# Patient Record
Sex: MALE | Race: WHITE | NOT HISPANIC OR LATINO | Employment: FULL TIME | URBAN - METROPOLITAN AREA
[De-identification: names, ages, dates, MRNs, and addresses within clinical notes are randomized per-mention and may not be internally consistent; named-entity substitution may affect disease eponyms.]

---

## 2018-12-06 ENCOUNTER — TRANSFERRED RECORDS (OUTPATIENT)
Dept: HEALTH INFORMATION MANAGEMENT | Facility: CLINIC | Age: 58
End: 2018-12-06

## 2019-07-22 ENCOUNTER — REFERRAL (OUTPATIENT)
Dept: TRANSPLANT | Facility: CLINIC | Age: 59
End: 2019-07-22

## 2019-07-22 DIAGNOSIS — Z01.818 ENCOUNTER FOR PRE-TRANSPLANT EVALUATION FOR KIDNEY AND PANCREAS TRANSPLANT: ICD-10-CM

## 2019-07-22 DIAGNOSIS — Z76.82 ORGAN TRANSPLANT CANDIDATE: ICD-10-CM

## 2019-07-22 DIAGNOSIS — I10 ESSENTIAL HYPERTENSION: ICD-10-CM

## 2019-07-22 DIAGNOSIS — E78.5 HYPERLIPIDEMIA: ICD-10-CM

## 2019-07-22 DIAGNOSIS — E11.9 DIABETES MELLITUS, TYPE 2 (H): ICD-10-CM

## 2019-07-22 DIAGNOSIS — Z87.891 HISTORY OF TOBACCO USE: ICD-10-CM

## 2019-07-22 DIAGNOSIS — N18.9 CHRONIC RENAL FAILURE: ICD-10-CM

## 2019-07-22 DIAGNOSIS — G47.33 OBSTRUCTIVE SLEEP APNEA: ICD-10-CM

## 2019-07-22 NOTE — LETTER
7/30/19    Dioni TEJEDA Marina  [address] [address] [place] [address]      Dear Dioni,    Thank you for your interest in the Transplant Center at [facility], [facility]. We look forward to being a part of your care team and assisting you through the transplant process.    As we discussed, your transplant coordinator is Erin Bradford (Kidney).  You may call your coordinator at any time with questions or concerns call [phone].    Please complete the following.    1. Fill out and return the enclosed forms    Authorization for Electronic Communication    Authorization to Discuss Protected Health Information    Authorization for Release of Protected Health Information    2. Sign up for:    [employer], access to your electronic medical record (see enclosed pamphlet)    [email or web address], a transplant education website    You can use these tools to learn more about your transplant, communicate with your care team, and track your medical details    Sincerely,  Solid Organ Transplant  [facility], [facility]    cc: Mikala Rodriguez NP (PCP), Teddy Hamilton MD

## 2019-07-29 VITALS — WEIGHT: 246 LBS | HEIGHT: 69 IN | BODY MASS INDEX: 36.43 KG/M2

## 2019-07-29 ASSESSMENT — MIFFLIN-ST. JEOR: SCORE: 1931.23

## 2019-07-29 NOTE — TELEPHONE ENCOUNTER
PCP: Dr. Mikala Rodriguez   Referring Provider: Dr. Teddy Hamilton   Referring Diagnosis: CKD Stage 4     Insurance information:  [facility]  Policy spears:  Self   Subscriber/policy/ID number: [id number]  Group Number: [id number]    Is patient in a group home/assisted living? No  Does patient have a guardian? No    Referral intake process completed.  Patient is aware that after financial approval is received, medical records will be requested.   Patient confirmed for a callback from transplant coordinator on 8/13/19. (within 2 weeks)  Tentative evaluation date didn't have his calendar with him. (within 4 weeks)    Confirmed coordinator will discuss evaluation process in more detail at the time of their call.   Patient is aware of the need to arrange age appropriate cancer screening, vaccinations, and dental care.  Reminded patient to complete questionnaire, complete medical records release, and review packet prior to evaluation visit .  Assessed patient for special needs (ie--wheelchair, assistance, guardian, and ): No  Patient instructed to call [phone] with questions.     ALICE Ackerman, LPN   Solid Organ Transplant

## 2019-08-13 NOTE — TELEPHONE ENCOUNTER
"I(Gardenia Hanley)Contacted patient and introduced myself as working their Transplant Coordinator(Karime), also introduced the role of the Transplant Coordinator in the transplant process.  Explained the purpose of this call including reviewing next steps and answering questions.    Confirmed Referring Provider, Dialysis Center(NA at this time), and Primary Care Physician. Notified patient of the importance of continued communication with referring providers and primary care physicians.    Reviewed components of transplant evaluation process including necessary appointments, tests, and procedures.    Answered questions for patient regarding evaluation, provided Karime's  name and contact information and requested they call with any additional questions.    Determined that patient would like additional information regarding transplant by:      Mail,Phone Call  Notified patient that he will hear from a Transplant  to schedule evaluation.     Reviewed medical/surgical information to date with the patient(pt)from [employer].Pt.Pt.stated he was referred to [facility] per his Nephrologist for a Kidney/Pancreas.He also went through [facility] Pre eval and was declined  May 2018 due to A1C  not < 8 . Pt.stated he never did have a kidney BX and was told his renal failure was most likely due to DM and HTN.GFR is now about 10 and not on dialysis yet.BMI 36.33 Pt. was DX with diabetes approximately sixteen years ago.When asked about his Lantus at [facility] pt.stated he quit taking his insulin all together at least two months ago.Pt. also stated he stopped checking his blood sugars for several months now \"except only when he does not feel well.\" Pt.stated he stopped taking his insulin \"because he did not feel it helped.\" He did not let his MD know this.Pt.also stated he does not feel he has had a Hgb A1C for a quite some time.\" May 2019 pt.had infection on  4th digit on his left foot that lead to  toe amputation.Pt.also had some right lower " "leg edema May 2019.Pt.has diabetic retinopathy and sees a  for eye injections every three months.Pt.has ADRIAN but states he has not used his CPAP for probably five years due to he felt the pressure was not right and he did not want to take the time to have his machine looked at.Pt.has a sixteen year 3 packs/day smoking Hx but states he quit 1994.Denies alcohol or recreational drug use.Pt.states he has never had a colonoscopy-\"everything else has been scanned before so I did not feel it was necessary.Appendectomy 2017.Hernia repair with [facility] approximately 1996.Pt.is followed by his Nephrologist Dr.Thomas Hamilton and pt.stated he has an appt.with him 8/16/19.Pt.stated he was an appt.with a new PCP next week with a DR. Duque.Pt.does not follow with an endocrinologist or cardiologist. When pt. had a CT for his appendectomy  2017 an incidental  nodule on the small bowel and lung were noted.Pt.did see a pulmonologist but  did not follow up with pulmomary nodule clinic. MR enterography 2/1/16-negative.Renal cyst 12 mm left renal sinus cysts -renal US. Recommended renal CT was not done.  Pt. states he has some vision deficits and will have his wife help hm fill out the ELI forms.     Patient verbalized understanding of content presented today.    Smart set orders sent.      "

## 2019-08-13 NOTE — TELEPHONE ENCOUNTER
Called patient as I had a KP opening at the end of August on Thurs the 29 and hr confirmed for this date.  W/[employer], I'll call [facility] [employer] to see if they have an opening on this Thurs with Cari if possible.

## 2019-08-13 NOTE — TELEPHONE ENCOUNTER
I also talked with Harshal as part of the pre kidney evaluation phone call. He has heard about pancreas transplant from his nephrologist so we spent some time on this. He is supposed to be taking Lantus but he worked his way up to 15 unit(s) at  and did not feel like it was bringing his blood sugar down. I told him most likely he was not seeing a response because he was not taking enough insulin. He stopped taking the insulin on his own and quit checking his blood sugars. I emphasized the importance of strict adherence to a medication and lab monitoring regimen and he seemed to understand. He has ADRIAN and should be using CPAP. He stopped using it because he felt the pressure was too high and refused to take the machine in for any adjustment. He simply quit using it. He has had diabetic foot ulcers and a toe amputated. He states he currently has no open wounds or infections. He had a CT done with an episode of appendicitis and a nodule was noted in his jejunum. A follow up enterography was negative. A pulmonary nodule was also noted on this CT. He did see a pulmonologist and was recommended to be seen in the pulmonary nodule clinic which was not done. He felt there was nothing to worry about and did not follow through. 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 The CT also indicated a 12 mm renal cyst and a renal ultrasound was done with the recommendation of a specific renal CT which was also not done. His BMI is 36.33 and we discussed the BMI criteria for kidney and pancreas. He is in agreement to see bariatrics. He was seen at [facility] and declined until he got his diabetes under control. He tried to tell me his nephrologist does not care about the management of his diabetes.  He is due for colonoscopy and dental. He has no potential living donors.

## 2019-08-23 NOTE — PROGRESS NOTES
Outpatient MNT: Kidney Pancreas Transplant Evaluation    Current BMI: 37.43 (HT 68.5 in, .8 lbs/113.3 kg)  BMI is not within criteria of <35 for kidney transplant  Ideal BMI Goal for pancreas transplant <30 for DM II (or per MD) or <200 lbs (50 lb loss)       Time Spent: 30 minutes  Visit Type: Initial   Referring Physician: Dr. Arreola  Pt accompanied by: Wife, Lela     Medical dx associated with RD referral  DMT2, CKD stage 4    History of previous txp: None  Frequency of BG checks: 3x/day  Dialysis: None    Nutrition Assessment  Pt tries to follow a low potassium and low phos diet. He successfully cut out a lot of calorie containing beverages by switching to diet beverages.      Appetite: Good    Vitamins, Supplements, Pertinent Meds: Lasix, Insulin, Renagel (with meals)  Herbal Medicines/Supplements: None    Diet Recall  Breakfast Sometimes skips; Toast with butter and jelly; poptart; August and eggs; Hashbrowns with gravy   Lunch [place] or soup   Dinner Steak and corn; hamburger; hotdogs; Spaghetti; potatoes and ham    Snacks Chips, cookies   Beverages Diet soda, diet jabier aid, diet cranberry juice, black coffee   Alcohol Estimates he's drank a total of one 12-pack over the entire summer      Physical Activity   Tries to stay active, but becomes fatigued easily and limited by foot pain. Pt reports having a toe amputated d/t diabetic neuropathy.      Anthropometrics  Height:   68.5 in   BMI:    37.43    Weight Status:Obesity Grade II BMI 35-39.9   Weight:  249.8 lbs            IBW: 157 lbs (71.4 kg)  % IBW: 159% Wt Hx:   Wt Readings from Last 10 Encounters:   07/29/19 111.6 kg (246 lb)       Adj/dosing BW: 180 lbs/82 kg      Labs  A1C: 8.2 on 8/21/19 per care everywhere - Improved from 11.9 on 3/5/19  POTASSIUM: 4.3 (WNL) on 8/12/19 per care everywhere  PHOSPHORUS: 7.6 (H) on 8/12/19 per care everywhere    Malnutrition  % Intake: No decreased intake noted  % Weight Loss: None noted  Subcutaneous Fat Loss:  None  Muscle Loss: None  Fluid Accumulation/Edema: None noted  Malnutrition Diagnosis: Patient does not meet two of the above criteria necessary for diagnosing malnutrition    Estimated Nutrition Needs  Energy  1721-7805     (20-25 kcal/kg dosing BW for desired wt loss)   Protein  49-66    (0.6-0.8 g/kg for CKD)  (1.2-1.4 g/kg for HD/PD needs)         Fluid  1 ml/kcal or per MD   Micronutrient   Na+: <2000 mg/day  K+: 7471-2854 mg/day  Phos: 800-1000 mg/day            Nutrition Diagnosis  1. Excessive Na+ intake r/t food and nutrition related knowledge deficit AEB diet recall reveals high Na+ foods.    2. Food and nutrition related knowledge deficit r/t pre transplant eval AEB pt verbalized not hearing pre/post transplant diet guidelines.    3. Obesity r/t excessive energy intake and inadequate physical activity AEB BMI >30.    Nutrition Intervention  Nutrition education provided:  Discussed strategies for weight loss. Encouraged pt to avoid snacking between meals. To limit his portions of carbohydrates, and consume balanced meals.     Discussed sodium intake (low sodium foods and drinks, seasoning food without salt and tips for low sodium diet).    Reviewed post txp diet guidelines in brief (will review in further detail post txp):  (1) Review of proper food safety measures d/t immunosuppressant therapy post-op and increased risk for food-borne illness    (2) Avoid the following post txp d/t risk for rejection, unknown effects on the organs, and/or potential interactions with immunosuppressants:  - Herbal, Chinese, holistic, chiropractic, natural, alternative medicines and supplements  - Detoxes and cleanses  - Weight loss pills  - Protein powders or other products with extracts or herbs (ie green tea extract)    (3) Med regimen and possible side effects    Patient Understanding: Pt verbalized understanding of education provided.  Expected Compliance: good  Follow-Up Plans: PRN     Nutrition Goals  1. Limit Na+  <2000mg/day  2. Pt to verbalize understanding of 3 aspects of post txp education provided  3. Weight loss to goal wt <233 lbs (BMI <35) for kidney txp or wt <200 lbs (BMI <30 for DM II or per MD) for pancreas txp      Hortensia Perry, RD, LD  [facility] [phone]

## 2019-08-29 ENCOUNTER — ANCILLARY PROCEDURE (OUTPATIENT)
Dept: CT IMAGING | Facility: CLINIC | Age: 59
End: 2019-08-29
Attending: NURSE PRACTITIONER
Payer: MEDICARE

## 2019-08-29 ENCOUNTER — OFFICE VISIT (OUTPATIENT)
Dept: TRANSPLANT | Facility: CLINIC | Age: 59
End: 2019-08-29
Attending: PHYSICIAN ASSISTANT
Payer: MEDICARE

## 2019-08-29 ENCOUNTER — ANCILLARY PROCEDURE (OUTPATIENT)
Dept: GENERAL RADIOLOGY | Facility: CLINIC | Age: 59
End: 2019-08-29
Attending: PHYSICIAN ASSISTANT
Payer: COMMERCIAL

## 2019-08-29 ENCOUNTER — HOSPITAL ENCOUNTER (OUTPATIENT)
Dept: CARDIOLOGY | Facility: CLINIC | Age: 59
Discharge: HOME | End: 2019-08-29
Attending: PHYSICIAN ASSISTANT | Admitting: PHYSICIAN ASSISTANT
Payer: MEDICARE

## 2019-08-29 ENCOUNTER — ANCILLARY PROCEDURE (OUTPATIENT)
Dept: ULTRASOUND IMAGING | Facility: CLINIC | Age: 59
End: 2019-08-29
Attending: NURSE PRACTITIONER
Payer: MEDICARE

## 2019-08-29 VITALS
HEART RATE: 81 BPM | OXYGEN SATURATION: 98 % | HEIGHT: 69 IN | DIASTOLIC BLOOD PRESSURE: 70 MMHG | SYSTOLIC BLOOD PRESSURE: 142 MMHG | WEIGHT: 249.8 LBS | TEMPERATURE: 98.5 F | BODY MASS INDEX: 37 KG/M2

## 2019-08-29 DIAGNOSIS — Z76.82 ORGAN TRANSPLANT CANDIDATE: ICD-10-CM

## 2019-08-29 DIAGNOSIS — E11.9 DIABETES MELLITUS, TYPE 2 (H): ICD-10-CM

## 2019-08-29 DIAGNOSIS — Z87.891 HISTORY OF TOBACCO USE: ICD-10-CM

## 2019-08-29 DIAGNOSIS — I73.9 PAD (PERIPHERAL ARTERY DISEASE) (H): ICD-10-CM

## 2019-08-29 DIAGNOSIS — Z01.818 ENCOUNTER FOR PRE-TRANSPLANT EVALUATION FOR KIDNEY AND PANCREAS TRANSPLANT: Primary | ICD-10-CM

## 2019-08-29 DIAGNOSIS — Z01.818 PRE-TRANSPLANT EVALUATION FOR KIDNEY AND PANCREAS TRANSPLANT: Primary | ICD-10-CM

## 2019-08-29 DIAGNOSIS — G47.33 OBSTRUCTIVE SLEEP APNEA: ICD-10-CM

## 2019-08-29 DIAGNOSIS — E11.21 TYPE 2 DIABETES MELLITUS WITH DIABETIC NEPHROPATHY, WITH LONG-TERM CURRENT USE OF INSULIN (H): ICD-10-CM

## 2019-08-29 DIAGNOSIS — Z79.4 TYPE 2 DIABETES MELLITUS WITH DIABETIC NEPHROPATHY, WITH LONG-TERM CURRENT USE OF INSULIN (H): ICD-10-CM

## 2019-08-29 DIAGNOSIS — Z01.818 ENCOUNTER FOR PRE-TRANSPLANT EVALUATION FOR KIDNEY AND PANCREAS TRANSPLANT: ICD-10-CM

## 2019-08-29 DIAGNOSIS — E78.5 HYPERLIPIDEMIA: ICD-10-CM

## 2019-08-29 DIAGNOSIS — I10 ESSENTIAL HYPERTENSION: ICD-10-CM

## 2019-08-29 DIAGNOSIS — N18.5 CHRONIC RENAL FAILURE, STAGE 5 (H): ICD-10-CM

## 2019-08-29 DIAGNOSIS — E66.01 CLASS 2 SEVERE OBESITY WITH SERIOUS COMORBIDITY AND BODY MASS INDEX (BMI) OF 37.0 TO 37.9 IN ADULT, UNSPECIFIED OBESITY TYPE (H): ICD-10-CM

## 2019-08-29 DIAGNOSIS — I15.0 RENOVASCULAR HYPERTENSION: ICD-10-CM

## 2019-08-29 DIAGNOSIS — E66.812 CLASS 2 SEVERE OBESITY WITH SERIOUS COMORBIDITY AND BODY MASS INDEX (BMI) OF 37.0 TO 37.9 IN ADULT, UNSPECIFIED OBESITY TYPE (H): ICD-10-CM

## 2019-08-29 DIAGNOSIS — Z76.82 ORGAN TRANSPLANT CANDIDATE: Primary | ICD-10-CM

## 2019-08-29 DIAGNOSIS — N18.9 CHRONIC RENAL FAILURE: ICD-10-CM

## 2019-08-29 DIAGNOSIS — N28.1 COMPLEX RENAL CYST: ICD-10-CM

## 2019-08-29 DIAGNOSIS — D63.1 ANEMIA IN STAGE 5 CHRONIC KIDNEY DISEASE, NOT ON CHRONIC DIALYSIS (H): ICD-10-CM

## 2019-08-29 DIAGNOSIS — E78.2 MIXED HYPERLIPIDEMIA: ICD-10-CM

## 2019-08-29 DIAGNOSIS — Z11.59 ENCOUNTER FOR SCREENING FOR OTHER VIRAL DISEASES: ICD-10-CM

## 2019-08-29 DIAGNOSIS — N18.5 CHRONIC KIDNEY DISEASE, STAGE 5 (H): ICD-10-CM

## 2019-08-29 DIAGNOSIS — Z12.5 ENCOUNTER FOR SCREENING FOR MALIGNANT NEOPLASM OF PROSTATE: ICD-10-CM

## 2019-08-29 DIAGNOSIS — N18.5 ANEMIA IN STAGE 5 CHRONIC KIDNEY DISEASE, NOT ON CHRONIC DIALYSIS (H): ICD-10-CM

## 2019-08-29 LAB
ABO + RH BLD: NORMAL
ALBUMIN SERPL-MCNC: 3.9 G/DL (ref 3.4–5)
ALBUMIN UR-MCNC: >499 MG/DL
ALP SERPL-CCNC: 57 U/L (ref 40–150)
ALT SERPL W P-5'-P-CCNC: 30 U/L (ref 0–70)
ANION GAP SERPL CALCULATED.3IONS-SCNC: 9 MMOL/L (ref 3–14)
APPEARANCE UR: CLEAR
APTT PPP: 35 SEC (ref 22–37)
AST SERPL W P-5'-P-CCNC: 22 U/L (ref 0–45)
BASOPHILS # BLD AUTO: 0.1 10E9/L (ref 0–0.2)
BASOPHILS NFR BLD AUTO: 0.6 %
BILIRUB SERPL-MCNC: 0.4 MG/DL (ref 0.2–1.3)
BILIRUB UR QL STRIP: NEGATIVE
BLD GP AB SCN SERPL QL: NORMAL
BLOOD BANK CMNT PATIENT-IMP: NORMAL
BLOOD BANK CMNT PATIENT-IMP: NORMAL
BUN SERPL-MCNC: 98 MG/DL (ref 7–30)
CALCIUM SERPL-MCNC: 8.8 MG/DL (ref 8.5–10.1)
CHLORIDE SERPL-SCNC: 103 MMOL/L (ref 94–109)
CO2 SERPL-SCNC: 26 MMOL/L (ref 20–32)
COLOR UR AUTO: ABNORMAL
CREAT SERPL-MCNC: 5.39 MG/DL (ref 0.66–1.25)
DIFFERENTIAL METHOD BLD: ABNORMAL
EOSINOPHIL # BLD AUTO: 0.2 10E9/L (ref 0–0.7)
EOSINOPHIL NFR BLD AUTO: 1.9 %
ERYTHROCYTE [DISTWIDTH] IN BLOOD BY AUTOMATED COUNT: 13.1 % (ref 10–15)
GFR SERPL CREATININE-BSD FRML MDRD: 11 ML/MIN/{1.73_M2}
GLUCOSE SERPL-MCNC: 161 MG/DL (ref 70–99)
GLUCOSE UR STRIP-MCNC: 150 MG/DL
HBA1C MFR BLD: 8.2 % (ref 0–5.6)
HCT VFR BLD AUTO: 30.9 % (ref 40–53)
HGB BLD-MCNC: 10.4 G/DL (ref 13.3–17.7)
HGB UR QL STRIP: ABNORMAL
HYALINE CASTS #/AREA URNS LPF: 1 /LPF (ref 0–2)
IMM GRANULOCYTES # BLD: 0 10E9/L (ref 0–0.4)
IMM GRANULOCYTES NFR BLD: 0.2 %
INR PPP: 1.01 (ref 0.86–1.14)
KETONES UR STRIP-MCNC: NEGATIVE MG/DL
LEUKOCYTE ESTERASE UR QL STRIP: NEGATIVE
LYMPHOCYTES # BLD AUTO: 1.3 10E9/L (ref 0.8–5.3)
LYMPHOCYTES NFR BLD AUTO: 14.9 %
MCH RBC QN AUTO: 30.5 PG (ref 26.5–33)
MCHC RBC AUTO-ENTMCNC: 33.7 G/DL (ref 31.5–36.5)
MCV RBC AUTO: 91 FL (ref 78–100)
MONOCYTES # BLD AUTO: 0.6 10E9/L (ref 0–1.3)
MONOCYTES NFR BLD AUTO: 6.7 %
MUCOUS THREADS #/AREA URNS LPF: PRESENT /LPF
NEUTROPHILS # BLD AUTO: 6.5 10E9/L (ref 1.6–8.3)
NEUTROPHILS NFR BLD AUTO: 75.7 %
NITRATE UR QL: NEGATIVE
NRBC # BLD AUTO: 0 10*3/UL
NRBC BLD AUTO-RTO: 0 /100
PH UR STRIP: 6 PH (ref 5–7)
PHOSPHATE SERPL-MCNC: 6.8 MG/DL (ref 2.5–4.5)
PLATELET # BLD AUTO: 184 10E9/L (ref 150–450)
POTASSIUM SERPL-SCNC: 4 MMOL/L (ref 3.4–5.3)
PROT SERPL-MCNC: 8.3 G/DL (ref 6.8–8.8)
PSA SERPL-ACNC: 1.89 UG/L (ref 0–4)
RBC # BLD AUTO: 3.41 10E12/L (ref 4.4–5.9)
RBC #/AREA URNS AUTO: 4 /HPF (ref 0–2)
SODIUM SERPL-SCNC: 138 MMOL/L (ref 133–144)
SOURCE: ABNORMAL
SP GR UR STRIP: 1.01 (ref 1–1.03)
SPECIMEN EXP DATE BLD: NORMAL
SPECIMEN EXP DATE BLD: NORMAL
UROBILINOGEN UR STRIP-MCNC: 0 MG/DL (ref 0–2)
WBC # BLD AUTO: 8.6 10E9/L (ref 4–11)
WBC #/AREA URNS AUTO: 3 /HPF (ref 0–5)

## 2019-08-29 PROCEDURE — 85025 COMPLETE CBC W/AUTO DIFF WBC: CPT | Performed by: NURSE PRACTITIONER

## 2019-08-29 PROCEDURE — 84100 ASSAY OF PHOSPHORUS: CPT | Performed by: NURSE PRACTITIONER

## 2019-08-29 PROCEDURE — G0103 PSA SCREENING: HCPCS | Performed by: NURSE PRACTITIONER

## 2019-08-29 PROCEDURE — 86850 RBC ANTIBODY SCREEN: CPT | Performed by: NURSE PRACTITIONER

## 2019-08-29 PROCEDURE — 85730 THROMBOPLASTIN TIME PARTIAL: CPT | Performed by: NURSE PRACTITIONER

## 2019-08-29 PROCEDURE — 86900 BLOOD TYPING SEROLOGIC ABO: CPT | Mod: 91 | Performed by: NURSE PRACTITIONER

## 2019-08-29 PROCEDURE — 40000866 ZZHCL STATISTIC HIV 1/2 ANTIGEN/ANTIBODY PRETRANSPLANT ONLY: Performed by: NURSE PRACTITIONER

## 2019-08-29 PROCEDURE — G0499 HEPB SCREEN HIGH RISK INDIV: HCPCS | Performed by: NURSE PRACTITIONER

## 2019-08-29 PROCEDURE — 85613 RUSSELL VIPER VENOM DILUTED: CPT | Mod: 91 | Performed by: NURSE PRACTITIONER

## 2019-08-29 PROCEDURE — 85670 THROMBIN TIME PLASMA: CPT | Performed by: NURSE PRACTITIONER

## 2019-08-29 PROCEDURE — 93306 TTE W/DOPPLER COMPLETE: CPT | Mod: 26 | Performed by: INTERNAL MEDICINE

## 2019-08-29 PROCEDURE — 86147 CARDIOLIPIN ANTIBODY EA IG: CPT | Performed by: NURSE PRACTITIONER

## 2019-08-29 PROCEDURE — 86665 EPSTEIN-BARR CAPSID VCA: CPT | Performed by: NURSE PRACTITIONER

## 2019-08-29 PROCEDURE — 83036 HEMOGLOBIN GLYCOSYLATED A1C: CPT | Performed by: NURSE PRACTITIONER

## 2019-08-29 PROCEDURE — 85597 PHOSPHOLIPID PLTLT NEUTRALIZ: CPT | Performed by: NURSE PRACTITIONER

## 2019-08-29 PROCEDURE — 84681 ASSAY OF C-PEPTIDE: CPT | Performed by: NURSE PRACTITIONER

## 2019-08-29 PROCEDURE — 85613 RUSSELL VIPER VENOM DILUTED: CPT | Performed by: NURSE PRACTITIONER

## 2019-08-29 PROCEDURE — 86886 COOMBS TEST INDIRECT TITER: CPT | Performed by: NURSE PRACTITIONER

## 2019-08-29 PROCEDURE — 85610 PROTHROMBIN TIME: CPT | Performed by: NURSE PRACTITIONER

## 2019-08-29 PROCEDURE — 36415 COLL VENOUS BLD VENIPUNCTURE: CPT | Performed by: NURSE PRACTITIONER

## 2019-08-29 PROCEDURE — 81001 URINALYSIS AUTO W/SCOPE: CPT | Performed by: NURSE PRACTITIONER

## 2019-08-29 PROCEDURE — 86704 HEP B CORE ANTIBODY TOTAL: CPT | Performed by: NURSE PRACTITIONER

## 2019-08-29 PROCEDURE — 81240 F2 GENE: CPT | Performed by: NURSE PRACTITIONER

## 2019-08-29 PROCEDURE — 25500064 ZZH RX 255 OP 636: Performed by: PHYSICIAN ASSISTANT

## 2019-08-29 PROCEDURE — 86901 BLOOD TYPING SEROLOGIC RH(D): CPT | Performed by: NURSE PRACTITIONER

## 2019-08-29 PROCEDURE — 86905 BLOOD TYPING RBC ANTIGENS: CPT | Performed by: NURSE PRACTITIONER

## 2019-08-29 PROCEDURE — 86644 CMV ANTIBODY: CPT | Performed by: NURSE PRACTITIONER

## 2019-08-29 PROCEDURE — 86787 VARICELLA-ZOSTER ANTIBODY: CPT | Performed by: NURSE PRACTITIONER

## 2019-08-29 PROCEDURE — G0463 HOSPITAL OUTPT CLINIC VISIT: HCPCS | Mod: ZF

## 2019-08-29 PROCEDURE — 86780 TREPONEMA PALLIDUM: CPT | Performed by: NURSE PRACTITIONER

## 2019-08-29 PROCEDURE — 40000264 ECHOCARDIOGRAM COMPLETE

## 2019-08-29 PROCEDURE — 81241 F5 GENE: CPT | Performed by: NURSE PRACTITIONER

## 2019-08-29 PROCEDURE — 80053 COMPREHEN METABOLIC PANEL: CPT | Performed by: NURSE PRACTITIONER

## 2019-08-29 PROCEDURE — G0472 HEP C SCREEN HIGH RISK/OTHER: HCPCS | Performed by: NURSE PRACTITIONER

## 2019-08-29 PROCEDURE — 86706 HEP B SURFACE ANTIBODY: CPT | Performed by: NURSE PRACTITIONER

## 2019-08-29 PROCEDURE — 86481 TB AG RESPONSE T-CELL SUSP: CPT | Performed by: NURSE PRACTITIONER

## 2019-08-29 RX ORDER — ACETAMINOPHEN 325 MG/1
325-650 TABLET ORAL
COMMUNITY
Start: 2018-10-12

## 2019-08-29 RX ORDER — CLONIDINE HYDROCHLORIDE 0.1 MG/1
0.1 TABLET ORAL
COMMUNITY
Start: 2019-08-19

## 2019-08-29 RX ORDER — METOLAZONE 2.5 MG/1
TABLET ORAL
Refills: 3 | COMMUNITY
Start: 2019-08-14

## 2019-08-29 RX ORDER — ASPIRIN 81 MG/1
81 TABLET ORAL
COMMUNITY
Start: 2017-06-15

## 2019-08-29 RX ORDER — CALCITRIOL 0.25 UG/1
CAPSULE, LIQUID FILLED ORAL
Refills: 3 | COMMUNITY
Start: 2019-08-14

## 2019-08-29 RX ORDER — CARVEDILOL 12.5 MG/1
TABLET ORAL
Refills: 0 | COMMUNITY
Start: 2019-08-14

## 2019-08-29 RX ORDER — LEVOTHYROXINE SODIUM 88 UG/1
88 TABLET ORAL
COMMUNITY
Start: 2019-08-19

## 2019-08-29 RX ORDER — FUROSEMIDE 40 MG
TABLET ORAL
Refills: 0 | COMMUNITY
Start: 2019-08-20

## 2019-08-29 RX ORDER — SEVELAMER HYDROCHLORIDE 400 MG/1
400 TABLET, FILM COATED ORAL
COMMUNITY
Start: 2018-12-06

## 2019-08-29 RX ORDER — NIFEDIPINE 30 MG/1
30 TABLET, EXTENDED RELEASE ORAL
COMMUNITY
Start: 2019-08-19

## 2019-08-29 RX ADMIN — HUMAN ALBUMIN MICROSPHERES AND PERFLUTREN 6 ML: 10; .22 INJECTION, SOLUTION INTRAVENOUS at 15:30

## 2019-08-29 ASSESSMENT — MIFFLIN-ST. JEOR: SCORE: 1940.53

## 2019-08-29 ASSESSMENT — PAIN SCALES - GENERAL: PAINLEVEL: NO PAIN (0)

## 2019-08-29 NOTE — PROGRESS NOTES
Assessment and Plan:  # Kidney/Pancreas Transplant Evaluation: Patient is a fair candidate overall. Benefits of a living donor transplant were discussed.    # CKD secondary to presumed type 2 diabetes: no previous biopsy. He has had declining renal function since 2015 and is nearing dialysis with the most recent serum creatinine of 5.8 and EGFR of 10.  When ready, he would likely benefit from a kidney transplant.  No potential donors at this time.    # Type 2 diabetes: has been poorly controlled in the past with hemoglobin A1c levels raning 10-14% since 2005, with most recent check of  8.2% in 8/2019. Currently using 16 units of insulin per day. Given evidence of end-organ damage, he may benefit from a pancreas transplant.     # BMI 37: with a protuberant abdomen. Dr. Arreola has recommended 50 lbs of weight loss. Recommend weight loss management clinic.    # Cardiac Risk: an ECHO from 12/2018 showed an EF of 66% and no significant valve disease detected. No history of stress test. CT chest in 2017 noted moderate CAD.  Will need a cardiac risk assessment and likely coronary angiogram given recent CT and long-standing history of poorly controlled diabetes.    # PAD: s/p toe amputation (2018). Will need CT and dopplers, per transplant surgery request.     #  Right complex septated renal cyst:  measuring  1.3 x 1.5 x 1.8 cm on 3/2016 renal ultrasound. Will need repeat renal ultrasound.     # Former smoker:  with a 40-pack-year smoking history, quit 30 years ago. Will need PFTs.     # Pulmonary nodule (4 mm): on CT chest from 2017 not changed significantly (no date for comparison noted).  Will need updated CT chest without contrast with local providers.    # Deconditioned: limited to walking 1/2-1 block with cane or walker due to fatigue and some shortness of breath.  Recommend physical therapy to help improve functional capacity.     # Noncompliance: although was certainly an issue in the past,  this appears to be much  improved now has he is checking his blood sugars 3 times a day and taking his insulin as prescribed.  He uses a medication box and reports good compliance that is well supported by his wife today. Would appreciate social work input.    # Health Maintenance: needs colonoscopy and dental check.    Addendum  # Positive lupus anticoagulant: repeat in 12 weeks.     Discussed the risks and benefits of a transplant, including the risk of surgery and immunosuppression medications.  Patient's overall evaluation will be discussed in the Transplant Program's regular meeting with a final recommendation on the patients suitability for transplant to be made at that time.  Patient was seen in conjunction with Dr. Aries Benjamin as part of a shared visit.    Evaluation:  Dioni Gray was seen in consultation at the request of Dr. Lokesh Arreola for evaluation as a potential kidney/pancreas transplant recipient.    Reason for Visit:  Dioni Gray is a 59 year old male with CKD from diabetes mellitus type 2, who presents for kidney/pancreas transplant evaluation.    History of Present Illness:  Dioni Gray is a 59-year-old gentleman that presents with CKD secondary to presumed type 2 diabetes.  He has had declining renal function since 2015 and is nearing dialysis with the most recent serum creatinine of 5.8 and EGFR of 10.  Overall, he is feeling poor and complains of fatigue and poor appetite but denies nausea and vomiting.  He has been a diabetic for 20 years with history of poor compliance with hemoglobin A1c levels raning 10-14% since 2005, with most recent check of  8.2% in 8/2019.  He reports good compliance now and is following with a diabetic educator.  He is checking his blood sugars 3 times a day and they typically range in the upper 100s (previously in the 300s) while using 16 units of insulin per day. His diabetes is complicated by retinopathy, peripheral neuropathy and a diabetic foot ulcer s/p toe amputation in  2018.  He has moderate CAD noted on an outside CT chest in 2017.  Most recent echo from 12/2018 showed an EF of 66% and no significant valve disease.  He is quite deconditioned and uses a walker or cane to get around and can only walk 1/2-1 block due to fatigue and some shortness of breath.  With exertion, he denies chest pain and claudication symptoms.  He is overweight with a BMI of 37 and a protuberant abdomen.  He is also a former smoker with a 40-pack-year smoking history and quit 30 years ago.             Kidney Disease Hx:         Kidney Disease Dx: Diabetes mellitus type 2       Biopsy Proven: No         On Dialysis: No       Primary Nephrologist: Dr. Hamilton        H/o Kidney Stones: No       H/o Recurrent/Frequent UTI: No         Diabetic Hx:        Diagnosis Date: 20 years        Medication History: currently using 10 units of Lantus and 6 total units of Novolog per day        Diabetic Control: Borderline control (HbA1c 7-9%)   Last HbA1c: 8.2% (8/2019)       Hypoglycemic Unawareness: No       End-Organ Damage due to DM: Retinopathy, Nephropathy and Peripheral neuropathy         Cardiac/Vascular Disease Risk Factors:        Cardiac Risk Factors: Diabetes, Hypertension, CKD, Smoking and Age (Male > 55, Female > 65)       Known CAD: Yes; on CT chest        Known PAD/Caludication Symptoms: No       Known Heart Failure: No       Arrhythmia: No       Pulmonary Hypertension: No       Valvular Disease: No       Other: None      Fatigue/Decreased Energy: [] No [x] Yes    Chest Pain or SOB with Exertion: [] No [x] Yes some SOB   Significant Weight Change: [x] No [] Yes    Nausea, Vomiting or Diarrhea: [x] No [] Yes    Fever, Sweats or Chills:  [x] No [] Yes    Leg Swelling [x] No [] Yes        History of Cancer: None    Other Significant Medical Issues:         - History of right complex septated renal cyst measuring  1.3 x 1.5 x 1.8 cm on 3/2016 renal ultrasound        - Pulmonary nodule (4 mm): on CT chest from  2017 not changed significantly (no date for comparison noted).         - History of jejunum nodule followed by normal  MR enterography in 2016.      Review of Systems:  A comprehensive review of systems was obtained and negative, except as noted in the HPI or PMH.    Past Medical History:   Medical record was reviewed and PMH was discussed with patient and noted below.  Past Medical History:   Diagnosis Date     Arthritis      Diabetes (H)     Type 2 DM. Insulin Intermit.      Thyroid disease        Past Social History:   Past Surgical History:   Procedure Laterality Date     ABDOMEN SURGERY      Appendix removed 2018     AMPUTATION      Left Foot, Fourth Digit      APPENDECTOMY      2018     HERNIA REPAIR      Mesh Used      Personal history of bleeding or anesthesia problems: No    Family History:  Family History   Problem Relation Age of Onset     Diabetes Father      Lung Cancer Sister        Personal History:   Social History     Socioeconomic History     Marital status:      Spouse name: Not on file     Number of children: Not on file     Years of education: Not on file     Highest education level: Not on file   Occupational History     Not on file   Social Needs     Financial resource strain: Not on file     Food insecurity:     Worry: Not on file     Inability: Not on file     Transportation needs:     Medical: Not on file     Non-medical: Not on file   Tobacco Use     Smoking status: Former Smoker     Types: Cigarettes     Smokeless tobacco: Never Used     Tobacco comment: Quit Smoking 20 Years Ago    Substance and Sexual Activity     Alcohol use: Yes     Comment: Occasional/Very Rarely      Drug use: Never     Sexual activity: Not on file   Lifestyle     Physical activity:     Days per week: Not on file     Minutes per session: Not on file     Stress: Not on file   Relationships     Social connections:     Talks on phone: Not on file     Gets together: Not on file     Attends Congregation service: Not  "on file     Active member of club or organization: Not on file     Attends meetings of clubs or organizations: Not on file     Relationship status: Not on file     Intimate partner violence:     Fear of current or ex partner: Not on file     Emotionally abused: Not on file     Physically abused: Not on file     Forced sexual activity: Not on file   Other Topics Concern     Parent/sibling w/ CABG, MI or angioplasty before 65F 55M? Not Asked   Social History Narrative     Not on file       Allergies:  Allergies   Allergen Reactions     Sulfamethoxazole-Trimethoprim Unknown     Sodium Hypochlorite Rash       Medications:  Current Outpatient Medications   Medication Sig     acetaminophen (TYLENOL) 325 MG tablet Take 325-650 mg by mouth     aspirin 81 MG EC tablet Take 81 mg by mouth     calcitRIOL (ROCALTROL) 0.25 MCG capsule Take 2 tablets on Monday, Wed and Friday 1 tablet on all other days.     carvedilol (COREG) 12.5 MG tablet Take 1 tablet by mouth 2 times daily with meals.     cloNIDine (CATAPRES) 0.1 MG tablet Take 0.1 mg by mouth     furosemide (LASIX) 40 MG tablet TAKE 3 TABLETS BY MOUTH TWICE DAILY     insulin aspart (NOVOLOG PEN) 100 UNIT/ML pen Inject 2 Units Subcutaneous     insulin detemir (LEVEMIR PEN) 100 UNIT/ML pen Inject 10 Units Subcutaneous     insulin pen needle (FIFTY50 PEN NEEDLES) 32G X 4 MM miscellaneous As directed. Use to inject insulin daily (using up to 4 needles per day)     levothyroxine (SYNTHROID/LEVOTHROID) 88 MCG tablet Take 88 mcg by mouth     metolazone (ZAROXOLYN) 2.5 MG tablet TAKE 1 TABLET BY MOUTH ONCE WEEKLY ON wednesdays IF needed     NIFEdipine ER OSMOTIC (PROCARDIA XL) 30 MG 24 hr tablet Take 30 mg by mouth     sevelamer (RENAGEL) 400 MG tablet Take 400 mg by mouth     No current facility-administered medications for this visit.        Vitals:  BP (!) 142/70   Pulse 81   Temp 98.5  F (36.9  C) (Oral)   Ht 1.74 m (5' 8.5\")   Wt 113.3 kg (249 lb 12.8 oz)   SpO2 98%   BMI " 37.43 kg/m      Exam:  GENERAL APPEARANCE: alert and no distress  HENT: mouth without ulcers or lesions. Poor dentition.   LYMPHATICS: no cervical or supraclavicular nodes  RESP: lungs clear to auscultation - no rales, rhonchi or wheezes  CV: regular rhythm, normal rate, no rub, no murmur  FEMORAL PULSES: unable to palpate  EDEMA: no LE edema bilaterally  ABDOMEN: soft, obese, nontender, bowel sounds normal  MS: extremities normal - no gross deformities noted, no evidence of inflammation in joints, no muscle tenderness  SKIN: no rash, toe amputation site healed.     Results:   No results found for this or any previous visit (from the past 336 hour(s)).

## 2019-08-29 NOTE — PROGRESS NOTES
"Kidney, Pancreas Transplant Referral - 7/22/2019  Dioni Gray attended the pre-transplant patient education class today with his wife Lela.  The [facility] website pre-transplant modules were viewed; class participants were educated on using the site.     Content reviewed:    Living Donation and how to access that program    Paired exchange    Kidney Donor Profile Index (KDPI)    Waiting list issues (right to decline without penalty, high PHS risk donors, what to expect when called with an offer)    Hospital experience,  length of stay , need to stay locally post-discharge (2-4 weeks)    Surgical options (with pictures)                             Post-surgery lifting and driving restrictions    Post-transplant routines, frequency of lab work and clinic visits    Need to stay locally post-discharge (2-4 weeks)    Role of Transplant Coordinator    Participants were informed of the benefits of transplant as well as potential risks such as infection, cancer, and death.  The need for total adherence with immunosuppression medications and following transplant regimens was stressed.  The overall evaluation/approval/listing process was reviewed.        The patient was provided with the following documents:  What You Need to Know About a Kidney Transplant  Adult Kidney Transplant - A Guide for Patients  SRTR Data Sheet - Kidney  Brochure - Kidney Allocation  What You Need to Know About a Pancreas Transplant  Adult Pancreas Transplant-A Guide for Patients  SRTR Data Sheet - Pancreas  Brochure - Pancreas  SRTR Data Sheet - Kidney/Pancreas  Brochure - SPK  Brochure - Multiple Listing and Waiting Time Transfer  What Every Patient Needs to Know (UNOS)  UNOS Facts and Figures  Finding a Donor  [facility] - Quick Start Guide  KDPI Consent  Receipt of Information form    Dioni Gray signed the  Receipt of Information for Organ Transplant Recipient.\" He was provided Erin Bradford's business card and " instructed to call with additional questions.      Summary    Team s concerns/comments: DM with obesity; Hx of non adherance with diabetic meds; DM II will work with his local provider to enhance BS control; History of smoking advise cessation; PFT's today;     Candidacy category: Yellow     Action/Plan: 1.  See wieg management provider in [place], [place] or [facility] PFT's today; Chest CT today; 3. Dental locally; 4. Colonoscopy locally or here 5. Complex renal cyst to be followed up. 6. Need Hepatitis B and pneumococcal vaccine per PCP     Expected Selection Meeting Discussion: September 4, 2019

## 2019-08-29 NOTE — PROGRESS NOTES
Psychosocial Assessment  Patient Name/ Age: Dioni Gray 59 year old   Medical Record #: [id number]  Duration of Interview: 30 min  Process:   Face-to-Face Interview                (counseling < 50%)   Present at Appointment: Harshal and his wife Lela        : RHODA Mercer LICSW Date:  August 29, 2019        Type of transplant: Kidney/Pancreas    Donor type: None identified att his time     [facility]   Prior Transplants:    No Status of Transplant: n/a           Current Living Situation    Location:   [address] [place] [id number]  With Whom: lives with their spouse       Family/ Social Support:    Harshal has two adult daughters, Karime (lives near him) and Rosemary (lives in [place]). He has 7 siblings. He reported he only has one brother that he gets along with (Italo).    available, helpful (daughters and one brother)   Committed Relationship: Harshal is  to Lela   Stable/Supportive   Other Supports: Friends, SILs who live local   available, helpful       Activities/ Functional Ability    Current Level: ambulatory, cane/walker, visually impaired (glasses) and independent with ADL's     Transportation drives self       Vocational/Employment/Financial     Employment   disabled   Job Description   Harshal reported he has been on SSDI since 2017 due to diabetes and ESRD. 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 His wife is retired.      Income   SSDI and Spouse's Income   Insurance      At this time, patient can afford medication costs:  Yes  Medicare and Federal BCBS       Medical Status    Current Mode of Treatment for ESRD None   Complications- Diabetes    Harshal does not always check his diet Eyes       Behavioral    Tobacco Use No Chemical Dependency No   Harshal denied any tobacco use.  Harshal reported he may have a couple beers a month in the summer but usually refrains from alcohol use. Harshal denied any current or history of substance use or chemical  dependency treatment.      Psychiatric Impairment No  Harshal denied any current or history of anxiety, depression, or other mental health concerns.     Reading Ability: Good  Education Level: Some College Recent Legal History No      Coping Style/Strategies: Christofer       Ability to Adhere to Complex Medical Regime: Yes     Adherence History: Harshal reported he does not always check his blood sugars. He reported he is now starting to check them 3 times a day now that he is on a new insulin. Harshal reported he quit taking the other insulin he was prescribed because he did not feel like it was working. He reported he was taking it as prescribed but about a month ago he felt it wasn't working as his diabetes was not controlled so he stopped. He recently established with a new provider who prescribed him a different medication, which he reports he is taking. He reported he is also scheduled to see a diabetic educator. Discussed the importance of taking medications as prescribed. Harshal reported he does not have any issues taking his other medications, which his wife confirmed.         Education  _X_ Medicare  _X_ Rehabilitation  _X_ Donor issues  _X_ Community resources  _X_ Post discharge housing  _X_ Financial resources  _X_ Medical insurance options  _X_ Psych adjustment  _X_ Family adjustment  _X_ Health Care Directive Provided Education   Psychosocial Risks of Transplant Reviewed and Discussed:  _X_ Increased stress related to emotional,            family, social, employment or financial           situation  _X_ Affect on work and/or disability benefits  _X_ Affect on future health and life           insurance  _X_ Transplant outcome expectations may           not be met  _X_ Mental Health Risks: anxiety,           depression, PTSD, guilt, grief and           chronic fatigue     Notable Items:   Harshal reported he does not always check his blood sugars. He reported he is now starting to check them 3 times a day now  that he is on a new insulin. Harshal reported he quit taking the other insulin he was prescribed because he did not feel like it was working. He reported he was taking it as prescribed but about a month ago he felt it wasn't working as his diabetes was not controlled so he stopped. He recently established with a new provider who prescribed him a different medication, which he reports he is taking. He reported he is also scheduled to see a diabetic educator. Discussed the importance of taking medications as prescribed. Harshal reported he does not have any issues taking his other medications, which his wife confirmed.       Final Evaluation/Assessment   Patient seemed to process information well. Appeared well informed, motivated and able to follow post transplant requirements. Behavior was appropriate during interview. Has adequate income and insurance coverage. Adequate social support. No major contraindications noted for transplant.  At this time patient appears to understand the risks and benefits of transplant.      Recommendation  Acceptable    Selection Criteria Met:  Plan for support Yes   Chemical Dependence Yes   Smoking Yes   Mental Health Yes   Adequate Finances Yes    Signature: RHODA Mercer [facility]   Title: Clinical

## 2019-08-29 NOTE — LETTER
8/29/2019       RE: Dioni Gray  [id number] [address] [address]     Dear Colleague,    Thank you for referring your patient, Dioni Gray, to the [facility] SOLID ORGAN TRANSPLANT at [facility]. Please see a copy of my visit note below.    Assessment and Plan:  # Kidney/Pancreas Transplant Evaluation: Patient is a fair candidate overall. Benefits of a living donor transplant were discussed.    # CKD secondary to presumed type 2 diabetes: no previous biopsy. He has had declining renal function since 2015 and is nearing dialysis with the most recent serum creatinine of 5.8 and EGFR of 10.  When ready, he would likely benefit from a kidney transplant.  No potential donors at this time.    # Type 2 diabetes: has been poorly controlled in the past with hemoglobin A1c levels raning 10-14% since 2005, with most recent check of  8.2% in 8/2019. Currently using 16 units of insulin per day. Given evidence of end-organ damage, he may benefit from a pancreas transplant.     # BMI 37: with a protuberant abdomen. Dr. Arreola has recommended 50 lbs of weight loss. Recommend weight loss management clinic.    # Cardiac Risk: an ECHO from 12/2018 showed an EF of 66% and no significant valve disease detected. No history of stress test. CT chest in 2017 noted moderate CAD.  Will need a cardiac risk assessment and likely coronary angiogram given recent CT and long-standing history of poorly controlled diabetes.    # PAD: s/p toe amputation (2018). Will need CT and dopplers, per transplant surgery request.     #  Right complex septated renal cyst:  measuring  1.3 x 1.5 x 1.8 cm on 3/2016 renal ultrasound. Will need repeat renal ultrasound.     # Former smoker:  with a 40-pack-year smoking history, quit 30 years ago. Will need PFTs.     # Pulmonary nodule (4 mm): on CT chest from 2017 not changed significantly (no date for comparison noted).  Will need updated CT chest without contrast  with local providers.    # Deconditioned: limited to walking 1/2-1 block with cane or walker due to fatigue and some shortness of breath.  Recommend physical therapy to help improve functional capacity.     # Noncompliance: although was certainly an issue in the past,  this appears to be much improved now has he is checking his blood sugars 3 times a day and taking his insulin as prescribed.  He uses a medication box and reports good compliance that is well supported by his wife today. Would appreciate social work input.    # Health Maintenance: needs colonoscopy and dental check.    Addendum  # Positive lupus anticoagulant: repeat in 12 weeks.     Discussed the risks and benefits of a transplant, including the risk of surgery and immunosuppression medications.  Patient's overall evaluation will be discussed in the Transplant Program's regular meeting with a final recommendation on the patients suitability for transplant to be made at that time.  Patient was seen in conjunction with Dr. Aries Benjamin as part of a shared visit.    Evaluation:  Dioni Gray was seen in consultation at the request of Dr. Lokesh Arreola for evaluation as a potential kidney/pancreas transplant recipient.    Reason for Visit:  Dioni Gray is a 59 year old male with CKD from diabetes mellitus type 2, who presents for kidney/pancreas transplant evaluation.    History of Present Illness:  Dioni Gray is a 59-year-old gentleman that presents with CKD secondary to presumed type 2 diabetes.  He has had declining renal function since 2015 and is nearing dialysis with the most recent serum creatinine of 5.8 and EGFR of 10.  Overall, he is feeling poor and complains of fatigue and poor appetite but denies nausea and vomiting.  He has been a diabetic for 20 years with history of poor compliance with hemoglobin A1c levels raning 10-14% since 2005, with most recent check of  8.2% in 8/2019.  He reports good compliance now and is following  with a diabetic educator.  He is checking his blood sugars 3 times a day and they typically range in the upper 100s (previously in the 300s) while using 16 units of insulin per day. His diabetes is complicated by retinopathy, peripheral neuropathy and a diabetic foot ulcer s/p toe amputation in 2018.  He has moderate CAD noted on an outside CT chest in 2017.  Most recent echo from 12/2018 showed an EF of 66% and no significant valve disease.  He is quite deconditioned and uses a walker or cane to get around and can only walk 1/2-1 block due to fatigue and some shortness of breath.  With exertion, he denies chest pain and claudication symptoms.  He is overweight with a BMI of 37 and a protuberant abdomen.  He is also a former smoker with a 40-pack-year smoking history and quit 30 years ago.             Kidney Disease Hx:         Kidney Disease Dx: Diabetes mellitus type 2       Biopsy Proven: No         On Dialysis: No       Primary Nephrologist: Dr. Hamilton        H/o Kidney Stones: No       H/o Recurrent/Frequent UTI: No         Diabetic Hx:        Diagnosis Date: 20 years        Medication History: currently using 10 units of Lantus and 6 total units of Novolog per day        Diabetic Control: Borderline control (HbA1c 7-9%)   Last HbA1c: 8.2% (8/2019)       Hypoglycemic Unawareness: No       End-Organ Damage due to DM: Retinopathy, Nephropathy and Peripheral neuropathy         Cardiac/Vascular Disease Risk Factors:        Cardiac Risk Factors: Diabetes, Hypertension, CKD, Smoking and Age (Male > 55, Female > 65)       Known CAD: Yes; on CT chest        Known PAD/Caludication Symptoms: No       Known Heart Failure: No       Arrhythmia: No       Pulmonary Hypertension: No       Valvular Disease: No       Other: None      Fatigue/Decreased Energy: [] No [x] Yes    Chest Pain or SOB with Exertion: [] No [x] Yes some SOB   Significant Weight Change: [x] No [] Yes    Nausea, Vomiting or Diarrhea: [x] No [] Yes    Fever,  Sweats or Chills:  [x] No [] Yes    Leg Swelling [x] No [] Yes        History of Cancer: None    Other Significant Medical Issues:         - History of right complex septated renal cyst measuring  1.3 x 1.5 x 1.8 cm on 3/2016 renal ultrasound        - Pulmonary nodule (4 mm): on CT chest from 2017 not changed significantly (no date for comparison noted).         - History of jejunum nodule followed by normal  MR enterography in 2016.      Review of Systems:  A comprehensive review of systems was obtained and negative, except as noted in the HPI or PMH.    Past Medical History:   Medical record was reviewed and PMH was discussed with patient and noted below.  Past Medical History:   Diagnosis Date     Arthritis      Diabetes (H)     Type 2 DM. Insulin Intermit.      Thyroid disease        Past Social History:   Past Surgical History:   Procedure Laterality Date     ABDOMEN SURGERY      Appendix removed 2018     AMPUTATION      Left Foot, Fourth Digit      APPENDECTOMY      2018     HERNIA REPAIR      Mesh Used      Personal history of bleeding or anesthesia problems: No    Family History:  Family History   Problem Relation Age of Onset     Diabetes Father      Lung Cancer Sister        Personal History:   Social History     Socioeconomic History     Marital status:      Spouse name: Not on file     Number of children: Not on file     Years of education: Not on file     Highest education level: Not on file   Occupational History     Not on file   Social Needs     Financial resource strain: Not on file     Food insecurity:     Worry: Not on file     Inability: Not on file     Transportation needs:     Medical: Not on file     Non-medical: Not on file   Tobacco Use     Smoking status: Former Smoker     Types: Cigarettes     Smokeless tobacco: Never Used     Tobacco comment: Quit Smoking 20 Years Ago    Substance and Sexual Activity     Alcohol use: Yes     Comment: Occasional/Very Rarely      Drug use: Never      Sexual activity: Not on file   Lifestyle     Physical activity:     Days per week: Not on file     Minutes per session: Not on file     Stress: Not on file   Relationships     Social connections:     Talks on phone: Not on file     Gets together: Not on file     Attends Tenriism service: Not on file     Active member of club or organization: Not on file     Attends meetings of clubs or organizations: Not on file     Relationship status: Not on file     Intimate partner violence:     Fear of current or ex partner: Not on file     Emotionally abused: Not on file     Physically abused: Not on file     Forced sexual activity: Not on file   Other Topics Concern     Parent/sibling w/ CABG, MI or angioplasty before 65F 55M? Not Asked   Social History Narrative     Not on file       Allergies:  Allergies   Allergen Reactions     Sulfamethoxazole-Trimethoprim Unknown     Sodium Hypochlorite Rash       Medications:  Current Outpatient Medications   Medication Sig     acetaminophen (TYLENOL) 325 MG tablet Take 325-650 mg by mouth     aspirin 81 MG EC tablet Take 81 mg by mouth     calcitRIOL (ROCALTROL) 0.25 MCG capsule Take 2 tablets on Monday, Wed and Friday 1 tablet on all other days.     carvedilol (COREG) 12.5 MG tablet Take 1 tablet by mouth 2 times daily with meals.     cloNIDine (CATAPRES) 0.1 MG tablet Take 0.1 mg by mouth     furosemide (LASIX) 40 MG tablet TAKE 3 TABLETS BY MOUTH TWICE DAILY     insulin aspart (NOVOLOG PEN) 100 UNIT/ML pen Inject 2 Units Subcutaneous     insulin detemir (LEVEMIR PEN) 100 UNIT/ML pen Inject 10 Units Subcutaneous     insulin pen needle (FIFTY50 PEN NEEDLES) 32G X 4 MM miscellaneous As directed. Use to inject insulin daily (using up to 4 needles per day)     levothyroxine (SYNTHROID/LEVOTHROID) 88 MCG tablet Take 88 mcg by mouth     metolazone (ZAROXOLYN) 2.5 MG tablet TAKE 1 TABLET BY MOUTH ONCE WEEKLY ON wednesdays IF needed     NIFEdipine ER OSMOTIC (PROCARDIA XL) 30 MG 24 hr  "tablet Take 30 mg by mouth     sevelamer (RENAGEL) 400 MG tablet Take 400 mg by mouth     No current facility-administered medications for this visit.        Vitals:  BP (!) 142/70   Pulse 81   Temp 98.5  F (36.9  C) (Oral)   Ht 1.74 m (5' 8.5\")   Wt 113.3 kg (249 lb 12.8 oz)   SpO2 98%   BMI 37.43 kg/m       Exam:  GENERAL APPEARANCE: alert and no distress  HENT: mouth without ulcers or lesions. Poor dentition.   LYMPHATICS: no cervical or supraclavicular nodes  RESP: lungs clear to auscultation - no rales, rhonchi or wheezes  CV: regular rhythm, normal rate, no rub, no murmur  FEMORAL PULSES: unable to palpate  EDEMA: no LE edema bilaterally  ABDOMEN: soft, obese, nontender, bowel sounds normal  MS: extremities normal - no gross deformities noted, no evidence of inflammation in joints, no muscle tenderness  SKIN: no rash, toe amputation site healed.     Results:   No results found for this or any previous visit (from the past 336 hour(s)).        Patient was seen by myself, Dr. Aries Benjamin, in conjunction with Negra Allen, as part of a shared visit.    I personally reviewed past medical and surgical history, vital signs, medications and labs.  Present and past medical history, along with significant physical exam findings were all reviewed with JAIRO.    My dow findings:  Dioni Gray is a 59 year old year old male with CKD from diabetes mellitus type 2, who presents for Kidney/Pancreas transplant evaluation.  Patient is a 59-year-old male with history of long-standing poorly controlled type 2 diabetes as well as progressive CKD who is here for a kidney transplant evaluation.    The patient's type 2 diabetes has been poorly controlled but improved of late which may be reflective of the decline in his kidney function.  His current insulin requirements are 16 to 18 units/day.  He is working on weight loss which would likely improve his blood sugar control.  He has no hypoglycemic unawareness.    The " patient's chronic kidney disease continues to worsen with a creatinine in the high 5 range and GFR now below 10 mL/min.  He has had no prior biopsy but this would be consistent with diabetic nephropathy.    The patient has market central obesity and will need to lose weight to be considered for either kidney or pancreas transplant.  He would need to lose to a BMI of 32 to accrue time on the pancreas wait list.  Given he has not yet on dialysis I have recommended that he be listed for kidney transplant while he completes his work-up.  He has no living donors at this time.    He has a history of a complex renal cyst that will need to be evaluated by an ultrasound for stability.    He also has a pulmonary nodule that needs follow-up with a chest CT.    Given his long-standing diabetes and chronic kidney disease and age he will need imaging of his vessels for target with CT abdomen pelvis and iliac ultrasound.    Overall the patient feels well.  He denies any chest pain or breathing difficulties.  He has no nausea or vomiting.  He has no fever shakes or chills.    Key management decisions made by me and discussed with JAIRO:  1. Kidney/Pancreas transplant evaluation - patient is a fair candidate overall. Benefits of a living donor transplant were discussed.  The patient has no living donors available at this time.  As such, I have recommended that he be listed as inactive on the kidney transplant list to accrue time while not yet on dialysis.  He will complete his work-up as below.  Regarding the pancreas transplant, the patient will need to lose weight to be a pancreas transplant candidate.  He also will need cardiac evaluation given his peripheral arterial disease and multiple risk factors as listed below.  2. CKD from diabetes mellitus type 2: Continue with follow-up with his general nephrologist for need for renal replacement therapy with dialytic modalities prior to kidney transplantation.  3. DMII: The patient will  continue to work with his providers to improve his blood sugar control.  Ideal A1c would be less than 7 g%.  For sure less than 8.  4. PAD: The patient has a history of peripheral arterial disease with prior amputation of toes.  He will need evaluation of target vessels with abdomen pelvis CT and iliac ultrasound.  5. Cancer screens: The patient will need to have his cancer screens up-to-date with colonoscopy, PSA and we did discuss good sun hygiene.  He will need dental evaluation as well.  6. CAD risk: The patient has multiple risk factors for coronary artery disease as well as a CT of his heart showing moderate calcification consistent with potentially occult coronary artery disease.  He has poor exercise tolerance and will need evaluation by cardiology as well as either stress testing or left heart catheterization to rule out significant coronary artery disease  7. Obesity: The patient has market central obesity and will need to lose weight as listed by our transplant surgeon.  We have recommended evaluation and management by her weight loss clinic.  8. Kidney cyst: The patient has a Bosniak 2F cyst that will need evaluation with a renal transplant ultrasound.  9. Pulmonary nodule: Patient has a history of pulmonary nodule as well as high risk behavior with tobacco use in the past.  We will get a chest CT to evaluate for stability.  10. Tobacco use: quit now, but heavy use in the past. Getting PFT's today.         Again, thank you for allowing me to participate in the care of your patient.      Sincerely,    OSWALDO

## 2019-08-29 NOTE — NURSING NOTE
"Chief Complaint   Patient presents with     Transplant Evaluation     Kidney/ panc eval       Blood pressure (!) 142/70, pulse 81, temperature 98.5  F (36.9  C), temperature source Oral, height 1.74 m (5' 8.5\"), weight 113.3 kg (249 lb 12.8 oz), SpO2 98 %.    Wilma Ferrari CMA on 8/29/2019 at 7:20 AM    "

## 2019-08-29 NOTE — PROGRESS NOTES
Transplant Surgery Consult Note    Medical record number: [id number]  YOB: 1960,   Consult requested for evaluation of kidney and pancreas transplant candidacy.    Assessment and Recommendations: Mr. Gray is a fair candidate for  transplantation and has a good understanding of the risks and benefits of this approach to management of renal failure and diabetes. The following issues should be addressed prior to transplant:     1.  Transplant order:  Recommend kidney alone for now.  If highly successful with weight loss could reevaluate for simultaneous kidney and pancreas transplant in the future.  2.  Vascular targets:  Iliac US and CT abd  3.  Weight loss:  Needs to lose weight to BMI < 35 for kidney, < 32 for panc (at miniumum).  Recommended evaluation by weight management team.     4.  Cardiology risk stratification    The majority of our visit was spent in counselling, discussing the medical and surgical risks of living or  donor kidney and pancreas transplantation, either in a simultaneous or sequential fashion. I contrasted approximate wait time for SPK vs living vs  donor kidneys from normal (0-85%) or higher (%) kidney donor profile index (KDPI) donors and their associated outcomes. I would recommend this individual to consider kidneys from high KDPI donors. Access to transplant will be impacted by living donor availability and overall candidacy for SPK, as well as the influence of blood type and degree of sensitization. We discussed advantages of preemptive transplant as well as living donor kidney transplant, and graft and patient survival outcomes associated with these options. Potential surgical complications of kidney and pancreas transplantation include bleeding, clotting, infection, wound complications, anastomotic failure and other issues such as cardiac complications, pneumonia, deep venous thrombosis, pulmonary embolism, post transplant diabetes and death. We  discussed the need for protocol biopsy of the kidney and the possible need for a ureteral stent (and subsequent removal). We discussed benefits and risks associated with different approaches to exocrine drainage of pancreatic secretions. We also discussed differences in the average length of stay, recovery process, and posttransplant lab and monitoring protocol. We discussed the risk of graft rejection and recurrent diabetic nephropathy in the setting of poor glycemic control. I emphasized the need for strict immunosuppression adherence and the potential for complications of immunosuppression such as skin cancer or lymphoma, as well as a very low but not zero risk of donor-derived disease transmission risks (infection, cancer). Mr. Gray asked good questions and the patient's candidacy will be reviewed at our Multidisciplinary Selection Committee. Thank you for the opportunity to participate in Mr. Gray's care.      I would recommend this individual to consider kidneys from high KDPI donors. The reason for this decision is best summarized as: decreased dialysis related morbidity/mortality, accepting lower kidney graft survival rates.    Total time: 45 minutes  Counselling time: 40 minutes    .      ---------------------------------------------------------------------------------------------------    HPI: Mr. Gray has Chronic renal failure due to diabetes mellitus type 2.   The patient has had diabetes for ~20 years.  The diabetes is uncontrolled.     Daily insulin requirement is approximately 16 units.  Daily blood glucoses range typically from 200 to 300.  Hypoglyemic unawareness is not an issue.  Complications of diabetes include:    Retinopathy:  Yes   Neuropathy: Yes   Gastroparesis:  No    The patient is not on dialysis.  Has kidney donors:  Doesn't know .    H/O toe amp for infection.  Umbo hernia repair.    ROS:  Cramps in feet.      No results found for: A1C    Past Medical History:   Diagnosis Date      Arthritis      Diabetes (H)     Type 2 DM. Insulin Intermit.      Thyroid disease      Past Surgical History:   Procedure Laterality Date     ABDOMEN SURGERY      Appendix removed 2018     AMPUTATION      Left Foot, Fourth Digit      APPENDECTOMY      2018     HERNIA REPAIR      Mesh Used      No family history on file.  Social History     Socioeconomic History     Marital status:      Spouse name: Not on file     Number of children: Not on file     Years of education: Not on file     Highest education level: Not on file   Occupational History     Not on file   Social Needs     Financial resource strain: Not on file     Food insecurity:     Worry: Not on file     Inability: Not on file     Transportation needs:     Medical: Not on file     Non-medical: Not on file   Tobacco Use     Smoking status: Former Smoker     Types: Cigarettes     Smokeless tobacco: Never Used     Tobacco comment: Quit Smoking 20 Years Ago    Substance and Sexual Activity     Alcohol use: Yes     Comment: Occasional/Very Rarely      Drug use: Never     Sexual activity: Not on file   Lifestyle     Physical activity:     Days per week: Not on file     Minutes per session: Not on file     Stress: Not on file   Relationships     Social connections:     Talks on phone: Not on file     Gets together: Not on file     Attends Nondenominational service: Not on file     Active member of club or organization: Not on file     Attends meetings of clubs or organizations: Not on file     Relationship status: Not on file     Intimate partner violence:     Fear of current or ex partner: Not on file     Emotionally abused: Not on file     Physically abused: Not on file     Forced sexual activity: Not on file   Other Topics Concern     Parent/sibling w/ CABG, MI or angioplasty before 65F 55M? Not Asked   Social History Narrative     Not on file       ROS:   CONSTITUTIONAL:  No fevers or chills  EYES: negative for icterus  ENT:  negative for hearing loss,  tinnitus and sore throat  RESPIRATORY:  negative for cough, sputum, dyspnea  CARDIOVASCULAR:  negative for chest pain   GASTROINTESTINAL:  negative for nausea, vomiting, diarrhea or constipation  GENITOURINARY:  negative for incontinence, dysuria, bladder emptying problems  HEME:  No easy bruising  INTEGUMENT:  negative for rash and pruritus  NEURO:  Negative for headache, seizure disorder    Allergies:   Allergies   Allergen Reactions     Sulfamethoxazole-Trimethoprim Unknown     Sodium Hypochlorite Rash       Medications:  Prescription Medications as of 8/29/2019       Rx Number Disp Refills Start End Last Dispensed Date Next Fill Date Owning Pharmacy    acetaminophen (TYLENOL) 325 MG tablet    10/12/2018        Sig: Take 325-650 mg by mouth    Class: Historical    Route: Oral    aspirin 81 MG EC tablet    6/15/2017        Sig: Take 81 mg by mouth    Class: Historical    Route: Oral    calcitRIOL (ROCALTROL) 0.25 MCG capsule   3 8/14/2019        Sig: Take 2 tablets on Monday, Wed and Friday 1 tablet on all other days.    Class: Historical    carvedilol (COREG) 12.5 MG tablet   0 8/14/2019        Sig: Take 1 tablet by mouth 2 times daily with meals.    Class: Historical    cloNIDine (CATAPRES) 0.1 MG tablet    8/19/2019        Sig: Take 0.1 mg by mouth    Class: Historical    Route: Oral    furosemide (LASIX) 40 MG tablet   0 8/20/2019        Sig: TAKE 3 TABLETS BY MOUTH TWICE DAILY    Class: Historical    insulin aspart (NOVOLOG PEN) 100 UNIT/ML pen    8/21/2019        Sig: Inject 2 Units Subcutaneous    Class: Historical    Route: Subcutaneous    insulin detemir (LEVEMIR PEN) 100 UNIT/ML pen    8/21/2019        Sig: Inject 10 Units Subcutaneous    Class: Historical    Route: Subcutaneous    insulin pen needle (FIFTY50 PEN NEEDLES) 32G X 4 MM miscellaneous    8/24/2019        Sig: As directed. Use to inject insulin daily (using up to 4 needles per day)    Class: Historical    levothyroxine (SYNTHROID/LEVOTHROID)  "88 MCG tablet    8/19/2019        Sig: Take 88 mcg by mouth    Class: Historical    Route: Oral    metolazone (ZAROXOLYN) 2.5 MG tablet   3 8/14/2019        Sig: TAKE 1 TABLET BY MOUTH ONCE WEEKLY ON wednesdays IF needed    Class: Historical    NIFEdipine ER OSMOTIC (PROCARDIA XL) 30 MG 24 hr tablet    8/19/2019        Sig: Take 30 mg by mouth    Class: Historical    Route: Oral    sevelamer (RENAGEL) 400 MG tablet    12/6/2018        Sig: Take 400 mg by mouth    Class: Historical    Route: Oral          Exam:   Vitals:   [unfilled]  Estimated body mass index is 37.43 kg/m  as calculated from the following:    Height as of an earlier encounter on 8/29/19: 1.74 m (5' 8.5\").    Weight as of an earlier encounter on 8/29/19: 113.3 kg (249 lb 12.8 oz).  Appearance: in no apparent distress.   Skin: normal  Head and Neck: Normal, no rashes or jaundice  Respiratory: easy respirations, no audible wheezing.  Abdomen: obese,     Diagnostics:   No results found for this or any previous visit (from the past 672 hour(s)).    "

## 2019-08-29 NOTE — LETTER
2019       RE: Dioni Gray  [id number] [address] [address]     Dear Colleague,    Thank you for referring your patient, Dioni Gray, to the [facility] at [facility]. Please see a copy of my visit note below.    Transplant Surgery Consult Note    Medical record number: [id number]  YOB: 1960,   Consult requested for evaluation of kidney and pancreas transplant candidacy.    Assessment and Recommendations: Mr. Gray is a fair candidate for  transplantation and has a good understanding of the risks and benefits of this approach to management of renal failure and diabetes. The following issues should be addressed prior to transplant:     1.  Transplant order:  Recommend kidney alone for now.  If highly successful with weight loss could reevaluate for simultaneous kidney and pancreas transplant in the future.  2.  Vascular targets:  Iliac US and CT abd  3.  Weight loss:  Needs to lose weight to BMI < 35 for kidney, < 32 for panc (at miniumum).  Recommended evaluation by weight management team.     4.  Cardiology risk stratification    The majority of our visit was spent in counselling, discussing the medical and surgical risks of living or  donor kidney and pancreas transplantation, either in a simultaneous or sequential fashion. I contrasted approximate wait time for SPK vs living vs  donor kidneys from normal (0-85%) or higher (%) kidney donor profile index (KDPI) donors and their associated outcomes. I would recommend this individual to consider kidneys from high KDPI donors. Access to transplant will be impacted by living donor availability and overall candidacy for SPK, as well as the influence of blood type and degree of sensitization. We discussed advantages of preemptive transplant as well as living donor kidney transplant, and graft and patient survival outcomes associated with these options.  Potential surgical complications of kidney and pancreas transplantation include bleeding, clotting, infection, wound complications, anastomotic failure and other issues such as cardiac complications, pneumonia, deep venous thrombosis, pulmonary embolism, post transplant diabetes and death. We discussed the need for protocol biopsy of the kidney and the possible need for a ureteral stent (and subsequent removal). We discussed benefits and risks associated with different approaches to exocrine drainage of pancreatic secretions. We also discussed differences in the average length of stay, recovery process, and posttransplant lab and monitoring protocol. We discussed the risk of graft rejection and recurrent diabetic nephropathy in the setting of poor glycemic control. I emphasized the need for strict immunosuppression adherence and the potential for complications of immunosuppression such as skin cancer or lymphoma, as well as a very low but not zero risk of donor-derived disease transmission risks (infection, cancer). Mr. Gray asked good questions and the patient's candidacy will be reviewed at our Multidisciplinary Selection Committee. Thank you for the opportunity to participate in Mr. Gray's care.      I would recommend this individual to consider kidneys from high KDPI donors. The reason for this decision is best summarized as: decreased dialysis related morbidity/mortality, accepting lower kidney graft survival rates.    Total time: 45 minutes  Counselling time: 40 minutes    .      ---------------------------------------------------------------------------------------------------    HPI: Mr. Gray has Chronic renal failure due to diabetes mellitus type 2.   The patient has had diabetes for ~20 years.  The diabetes is uncontrolled.     Daily insulin requirement is approximately 16 units.  Daily blood glucoses range typically from 200 to 300.  Hypoglyemic unawareness is not an issue.  Complications of  diabetes include:    Retinopathy:  Yes   Neuropathy: Yes   Gastroparesis:  No    The patient is not on dialysis.  Has kidney donors:  Doesn't know .    H/O toe amp for infection.  Umbo hernia repair.    ROS:  Cramps in feet.      No results found for: A1C    Past Medical History:   Diagnosis Date     Arthritis      Diabetes (H)     Type 2 DM. Insulin Intermit.      Thyroid disease      Past Surgical History:   Procedure Laterality Date     ABDOMEN SURGERY      Appendix removed 2018     AMPUTATION      Left Foot, Fourth Digit      APPENDECTOMY      2018     HERNIA REPAIR      Mesh Used      No family history on file.  Social History     Socioeconomic History     Marital status:      Spouse name: Not on file     Number of children: Not on file     Years of education: Not on file     Highest education level: Not on file   Occupational History     Not on file   Social Needs     Financial resource strain: Not on file     Food insecurity:     Worry: Not on file     Inability: Not on file     Transportation needs:     Medical: Not on file     Non-medical: Not on file   Tobacco Use     Smoking status: Former Smoker     Types: Cigarettes     Smokeless tobacco: Never Used     Tobacco comment: Quit Smoking 20 Years Ago    Substance and Sexual Activity     Alcohol use: Yes     Comment: Occasional/Very Rarely      Drug use: Never     Sexual activity: Not on file   Lifestyle     Physical activity:     Days per week: Not on file     Minutes per session: Not on file     Stress: Not on file   Relationships     Social connections:     Talks on phone: Not on file     Gets together: Not on file     Attends [employer] service: Not on file     Active member of club or organization: Not on file     Attends meetings of clubs or organizations: Not on file     Relationship status: Not on file     Intimate partner violence:     Fear of current or ex partner: Not on file     Emotionally abused: Not on file     Physically abused: Not on  file     Forced sexual activity: Not on file   Other Topics Concern     Parent/sibling w/ CABG, MI or angioplasty before 65F 55M? Not Asked   Social History Narrative     Not on file       ROS:   CONSTITUTIONAL:  No fevers or chills  EYES: negative for icterus  ENT:  negative for hearing loss, tinnitus and sore throat  RESPIRATORY:  negative for cough, sputum, dyspnea  CARDIOVASCULAR:  negative for chest pain   GASTROINTESTINAL:  negative for nausea, vomiting, diarrhea or constipation  GENITOURINARY:  negative for incontinence, dysuria, bladder emptying problems  HEME:  No easy bruising  INTEGUMENT:  negative for rash and pruritus  NEURO:  Negative for headache, seizure disorder    Allergies:   Allergies   Allergen Reactions     Sulfamethoxazole-Trimethoprim Unknown     Sodium Hypochlorite Rash       Medications:  Prescription Medications as of 8/29/2019       Rx Number Disp Refills Start End Last Dispensed Date Next Fill Date Owning Pharmacy    acetaminophen (TYLENOL) 325 MG tablet    10/12/2018        Sig: Take 325-650 mg by mouth    Class: Historical    Route: Oral    aspirin 81 MG EC tablet    6/15/2017        Sig: Take 81 mg by mouth    Class: Historical    Route: Oral    calcitRIOL (ROCALTROL) 0.25 MCG capsule   3 8/14/2019        Sig: Take 2 tablets on Monday, Wed and Friday 1 tablet on all other days.    Class: Historical    carvedilol (COREG) 12.5 MG tablet   0 8/14/2019        Sig: Take 1 tablet by mouth 2 times daily with meals.    Class: Historical    cloNIDine (CATAPRES) 0.1 MG tablet    8/19/2019        Sig: Take 0.1 mg by mouth    Class: Historical    Route: Oral    furosemide (LASIX) 40 MG tablet   0 8/20/2019        Sig: TAKE 3 TABLETS BY MOUTH TWICE DAILY    Class: Historical    insulin aspart (NOVOLOG PEN) 100 UNIT/ML pen    8/21/2019        Sig: Inject 2 Units Subcutaneous    Class: Historical    Route: Subcutaneous    insulin detemir (LEVEMIR PEN) 100 UNIT/ML pen    8/21/2019        Sig: Inject  "10 Units Subcutaneous    Class: Historical    Route: Subcutaneous    insulin pen needle (FIFTY50 PEN NEEDLES) 32G X 4 MM miscellaneous    8/24/2019        Sig: As directed. Use to inject insulin daily (using up to 4 needles per day)    Class: Historical    levothyroxine (SYNTHROID/LEVOTHROID) 88 MCG tablet    8/19/2019        Sig: Take 88 mcg by mouth    Class: Historical    Route: Oral    metolazone (ZAROXOLYN) 2.5 MG tablet   3 8/14/2019        Sig: TAKE 1 TABLET BY MOUTH ONCE WEEKLY ON wednesdays IF needed    Class: Historical    NIFEdipine ER OSMOTIC (PROCARDIA XL) 30 MG 24 hr tablet    8/19/2019        Sig: Take 30 mg by mouth    Class: Historical    Route: Oral    sevelamer (RENAGEL) 400 MG tablet    12/6/2018        Sig: Take 400 mg by mouth    Class: Historical    Route: Oral          Exam:   Vitals:   [unfilled]  Estimated body mass index is 37.43 kg/m  as calculated from the following:    Height as of an earlier encounter on 8/29/19: 1.74 m (5' 8.5\").    Weight as of an earlier encounter on 8/29/19: 113.3 kg (249 lb 12.8 oz).  Appearance: in no apparent distress.   Skin: normal  Head and Neck: Normal, no rashes or jaundice  Respiratory: easy respirations, no audible wheezing.  Abdomen: obese,     Diagnostics:   No results found for this or any previous visit (from the past 672 hour(s)).      Again, thank you for allowing me to participate in the care of your patient.      Sincerely,    OSWALDO      "

## 2019-08-30 LAB
CARDIOLIPIN ANTIBODY IGG: <1.6 GPL-U/ML (ref 0–19.9)
CARDIOLIPIN ANTIBODY IGM: 5.9 MPL-U/ML (ref 0–19.9)
CMV IGG SERPL QL IA: 7.9 AI (ref 0–0.8)
DLCOUNC-%PRED-PRE: 81 %
DLCOUNC-PRE: 21.87 ML/MIN/MMHG
DLCOUNC-PRED: 26.88 ML/MIN/MMHG
EBV VCA IGG SER QL IA: >8 AI (ref 0–0.8)
ERV-%PRED-PRE: 68 %
ERV-PRE: 0.44 L
ERV-PRED: 0.65 L
EXPTIME-PRE: 7.46 SEC
FEF2575-%PRED-POST: 93 %
FEF2575-%PRED-PRE: 101 %
FEF2575-POST: 2.86 L/SEC
FEF2575-PRE: 3.1 L/SEC
FEF2575-PRED: 3.05 L/SEC
FEFMAX-%PRED-PRE: 77 %
FEFMAX-PRE: 7.07 L/SEC
FEFMAX-PRED: 9.1 L/SEC
FEV1-%PRED-PRE: 76 %
FEV1-PRE: 2.69 L
FEV1FEV6-PRE: 84 %
FEV1FEV6-PRED: 79 %
FEV1FVC-PRE: 84 %
FEV1FVC-PRED: 78 %
FEV1SVC-PRE: 81 %
FEV1SVC-PRED: 73 %
FIFMAX-PRE: 4.1 L/SEC
FRCPLETH-%PRED-PRE: 79 %
FRCPLETH-PRE: 2.79 L
FRCPLETH-PRED: 3.49 L
FVC-%PRED-PRE: 70 %
FVC-PRE: 3.19 L
FVC-PRED: 4.51 L
HBV CORE AB SERPL QL IA: NONREACTIVE
HBV SURFACE AB SERPL IA-ACNC: 0.73 M[IU]/ML
HBV SURFACE AG SERPL QL IA: NONREACTIVE
HCV AB SERPL QL IA: NONREACTIVE
HIV 1+2 AB+HIV1 P24 AG SERPL QL IA: NONREACTIVE
IC-%PRED-PRE: 68 %
IC-PRE: 2.85 L
IC-PRED: 4.15 L
RVPLETH-%PRED-PRE: 101 %
RVPLETH-PRE: 2.34 L
RVPLETH-PRED: 2.3 L
T PALLIDUM AB SER QL: NONREACTIVE
THROMBIN TIME: 17.5 SEC (ref 13–19)
TLCPLETH-%PRED-PRE: 82 %
TLCPLETH-PRE: 5.64 L
TLCPLETH-PRED: 6.82 L
VA-%PRED-PRE: 73 %
VA-PRE: 4.59 L
VC-%PRED-PRE: 68 %
VC-PRE: 3.3 L
VC-PRED: 4.8 L
VZV IGG SER QL IA: 3 AI (ref 0–0.8)

## 2019-08-30 NOTE — PROGRESS NOTES
Patient was seen by myself, Dr. Aries Benjamin, in conjunction with Negra Allen, as part of a shared visit.    I personally reviewed past medical and surgical history, vital signs, medications and labs.  Present and past medical history, along with significant physical exam findings were all reviewed with JAIRO.    My dow findings:  Dioni Gray is a 59 year old year old male with CKD from diabetes mellitus type 2, who presents for Kidney/Pancreas transplant evaluation.  Patient is a 59-year-old male with history of long-standing poorly controlled type 2 diabetes as well as progressive CKD who is here for a kidney transplant evaluation.    The patient's type 2 diabetes has been poorly controlled but improved of late which may be reflective of the decline in his kidney function.  His current insulin requirements are 16 to 18 units/day.  He is working on weight loss which would likely improve his blood sugar control.  He has no hypoglycemic unawareness.    The patient's chronic kidney disease continues to worsen with a creatinine in the high 5 range and GFR now below 10 mL/min.  He has had no prior biopsy but this would be consistent with diabetic nephropathy.    The patient has market central obesity and will need to lose weight to be considered for either kidney or pancreas transplant.  He would need to lose to a BMI of 32 to accrue time on the pancreas wait list.  Given he has not yet on dialysis I have recommended that he be listed for kidney transplant while he completes his work-up.  He has no living donors at this time.    He has a history of a complex renal cyst that will need to be evaluated by an ultrasound for stability.    He also has a pulmonary nodule that needs follow-up with a chest CT.    Given his long-standing diabetes and chronic kidney disease and age he will need imaging of his vessels for target with CT abdomen pelvis and iliac ultrasound.    Overall the patient feels well.  He denies  any chest pain or breathing difficulties.  He has no nausea or vomiting.  He has no fever shakes or chills.    Key management decisions made by me and discussed with JAIRO:  1. Kidney/Pancreas transplant evaluation - patient is a fair candidate overall. Benefits of a living donor transplant were discussed.  The patient has no living donors available at this time.  As such, I have recommended that he be listed as inactive on the kidney transplant list to accrue time while not yet on dialysis.  He will complete his work-up as below.  Regarding the pancreas transplant, the patient will need to lose weight to be a pancreas transplant candidate.  He also will need cardiac evaluation given his peripheral arterial disease and multiple risk factors as listed below.  2. CKD from diabetes mellitus type 2: Continue with follow-up with his general nephrologist for need for renal replacement therapy with dialytic modalities prior to kidney transplantation.  3. DMII: The patient will continue to work with his providers to improve his blood sugar control.  Ideal A1c would be less than 7 g%.  For sure less than 8.  4. PAD: The patient has a history of peripheral arterial disease with prior amputation of toes.  He will need evaluation of target vessels with abdomen pelvis CT and iliac ultrasound.  5. Cancer screens: The patient will need to have his cancer screens up-to-date with colonoscopy, PSA and we did discuss good sun hygiene.  He will need dental evaluation as well.  6. CAD risk: The patient has multiple risk factors for coronary artery disease as well as a CT of his heart showing moderate calcification consistent with potentially occult coronary artery disease.  He has poor exercise tolerance and will need evaluation by cardiology as well as either stress testing or left heart catheterization to rule out significant coronary artery disease  7. Obesity: The patient has market central obesity and will need to lose weight as  listed by our transplant surgeon.  We have recommended evaluation and management by her weight loss clinic.  8. Kidney cyst: The patient has a Bosniak 2F cyst that will need evaluation with a renal transplant ultrasound.  9. Pulmonary nodule: Patient has a history of pulmonary nodule as well as high risk behavior with tobacco use in the past.  We will get a chest CT to evaluate for stability.  10. Tobacco use: quit now, but heavy use in the past. Getting PFT's today.

## 2019-08-31 LAB
BLD GP AB SCN TITR SERPL: NORMAL {TITER}
INTERPRETATION ECG - MUSE: NORMAL
LA PPP-IMP: POSITIVE

## 2019-09-01 LAB — C PEPTIDE SERPL-MCNC: 12 NG/ML (ref 0.9–6.9)

## 2019-09-03 LAB
COPATH REPORT: NORMAL
GAMMA INTERFERON BACKGROUND BLD IA-ACNC: 0.03 IU/ML
M TB IFN-G BLD-IMP: NEGATIVE
M TB IFN-G CD4+ BCKGRND COR BLD-ACNC: >10 IU/ML
MITOGEN IGNF BCKGRD COR BLD-ACNC: 0 IU/ML
MITOGEN IGNF BCKGRD COR BLD-ACNC: 0 IU/ML

## 2019-09-04 ENCOUNTER — COMMITTEE REVIEW (OUTPATIENT)
Dept: TRANSPLANT | Facility: CLINIC | Age: 59
End: 2019-09-04

## 2019-09-04 DIAGNOSIS — R79.1 ABNORMAL COAGULATION PROFILE: ICD-10-CM

## 2019-09-04 DIAGNOSIS — Z76.82 AWAITING ORGAN TRANSPLANT: Primary | ICD-10-CM

## 2019-09-04 LAB
A* LOCUS: NORMAL
A*: NORMAL
ABTEST METHOD: NORMAL
B* LOCUS: NORMAL
B*: NORMAL
BW-1: NORMAL
BW-2: NORMAL
C* LOCUS: NORMAL
C*: NORMAL
DPA1* NMDP: NORMAL
DPA1*: NORMAL
DPA1*LOCUS: NORMAL
DPB1* LOCUS NMDP: NORMAL
DPB1* NMDP: NORMAL
DPB1*: NORMAL
DPB1*LOCUS: NORMAL
DQA1*: NORMAL
DQA1*LOCUS: NORMAL
DQB1* LOCUS: NORMAL
DQB1*: NORMAL
DRB1* LOCUS: NORMAL
DRB1*: NORMAL
DRB3* LOCUS: NORMAL
DRB4*: NORMAL
DRSSO TEST METHOD: NORMAL
PROTOCOL CUTOFF: NORMAL
SA1 CELL: NORMAL
SA1 COMMENTS: NORMAL
SA1 HI RISK ABY: NORMAL
SA1 MOD RISK ABY: NORMAL
SA1 TEST METHOD: NORMAL
SA2 CELL: NORMAL
SA2 COMMENTS: NORMAL
SA2 HI RISK ABY UA: NORMAL
SA2 MOD RISK ABY: NORMAL
SA2 TEST METHOD: NORMAL
UNACCEPTABLE ANTIGEN: NORMAL
UNOS CPRA: 0

## 2019-09-04 NOTE — COMMITTEE REVIEW
Abdominal Committee Review Note     Evaluation Date: 8/29/2019  Committee Review Date: 9/4/2019    Organ being evaluated for: Kidney/Pancreas    Transplant Phase: Evaluation  Transplant Status: Active    Transplant Coordinator: Erin Bradford  Transplant Surgeon:   Lokesh Arreola    Referring Physician: Teddy Hamilton    Primary Diagnosis: Diabetes Mellitus - Type II (Pancreas)  Secondary Diagnosis:     Committee Review Members:  Nephrology Aries Benjamin MD, Leonid Allen, APRN CNP, Zander Swain MD   Pharmacy Galdino Daly, [facility]    - Clinical Joan An, [facility], Brandi Singh, [facility]   Transplant Rosie Alford PA-C, Faina Bush, ALIA, Esther Jj, JACKSON, Gardenia Hanley, RN, Maribell Maradiaga, RN, Day Devlin, RN, Sandeep Gamez MD, Erin Bradford, RN       Transplant Eligibility: Insulin-dependent diabetes mellitus, Irreversible chronic kidney disease treated w/dialysis or expected need for dialysis    Committee Review Decision: Approved    Relative Contraindications: None    Absolute Contraindications: BMI >30    Committee Chair Sandeep Gamez MD verbally attested to the committee's decision.    Committee Discussion Details: Reviewed medical records and evaluation results to date. Committee is approving patient as a kidney transplant candidate and will be listed as inactive with a qualifying GFR of 11. The decision about pancreas is deferred. His BMI is 37.43 and Dr Arreola recommends at least a 50 lb weight loss. He will need to see bariatrics. He will need a dedicated renal ultrasound to follow a right complex septated renal cyst. Needs a chest CT for follow up of nodules. He will need cardiology. We are recommending physical therapy to improve his functional status. Lupus was positive and will need repeat in 12 weeks.He will need to complete a colonoscopy and dental . Needs Hepatitis B and pneumovax vaccinations. Patient will be called, inactive  listing letter and transplant summary letter will be sent.

## 2019-09-04 NOTE — Clinical Note
See orders for bariatrics, cardiology, renal ultrasound, chest CT without, and repeat lupus lab in 12 weeks.

## 2019-09-04 NOTE — LETTER
2019    Dioni Gray  [address] [address]  [place] [address]      Dear Harshal,    It was a pleasure to see you here recently for consideration of a kidney and pancreas transplant. Your pre-transplant evaluation began on 2019. Your evaluation results were discussed at our Multidisciplinary Selection Committee on 2019. The Committee is approving you for kidney alone at this point due to needing to lose weight. You will be listed on the  donor kidney transplant wait list to start accruing waiting time. Once you have lost weight, the issue of a pancreas transplant will be discussed again. In the meantime, we are asking you to complete the following items:    1. We are asking you to see cardiology to make sure your heart can undergo the stress of a transplant. Our  will call you with this appointment.  2. We are asking you to have a renal ultrasound to document stability and take a closer look at a cyst in your right kidney. Our  will call you with this appointment.  3. We are asking you to have a chest CT to follow up on nodules that were noted on a previous scan. This is to document stability. Our  will call you with this appointment.  4. We are asking you to see our Bariatric providers for a weight loss plan. Our  will call you with this appointment. Dr Arreola is recommending a 50 pound weight loss to safely do a transplant. Once you hit this goal, we will re-look at the possibility of doing a pancreas transplant.  5. Please talk with your primary care provider about referring you to physical therapy to improve your functional status. This will improve your ability to recover from a transplant.  6. One of our blood test called lupus anticoagulant was positive. This is an indicator for clotting but all of your other labs were normal. Sometimes, this can be a false positive so our protocol is to repeat the blood test in 12 weeks. If you  are here for an appointment we can get the blood test or you may have it done locally so as to not have to make a special trip here.  7. Please schedule a screening colonoscopy with your primary care provider. Please call me when complete so I can request those records.  8. Please schedule an appointment with your dentist and have any recommended work done. Call me when complete.  9. Your blood work shows you do not have immunity to Hepatitis B. You will need the vaccination series as well as the pneumovax vaccination against pneumonia. These can be given by your primary care provider or at the dialysis unit once you start dialysis. Please call me when you receive the vaccinations so I can record them in your chart.  You have already been added onto the kidney transplant wait list on September 5, 2019 but are on INACTIVE status due to needing to successfully complete the above items and the weight loss. This means you are accruing waiting time but are not yet eligible to receive organ offers. A separate letter regarding your listing has been mailed. You will be notified by our office as to when your status can be changed to ACTIVE.    Please have any potential living donors register now online with our program to initiate their evaluation at [email or web address] or call [phone]. You will be notified by our office in the event of an approved live donor.    Please feel free to call me with any questions at [phone].      Sincerely,       Karime Bradford, RN, BSN Transplant Coordinator  [facility] [address]  [address] [place] [address], [address] [address]  Phone [phone]  Fax [phone]  [email or web address]    CC:Teddy Keene

## 2019-09-04 NOTE — LETTER
2019    Dioni Gray  [address] [address]  [place] [address]      Dear Harshal,    This letter is sent to confirm that you have nearlycompleted your transplant work-up and you are a candidate in the kidney transplant program at the [facility].  You were placed on the kidney inactive waitlist on 2019.  This means you will accumulate waiting time but not receive  donor calls.       Items we will need from you:      We have received approval from you insurance company for the transplant procedure.  It is critical that you notify us if there is any change in your insurance.  It is also important that you familiarize yourself with the details of your specific insurance policy.  Our patient  is available to assist you if you should have any questions regarding your coverage.      An ALA or PRA blood sample will need to be sent here every 3 months to match you with  donors or any potential living donors.  If you need this testing, special mailing boxes (called mailers) will be sent to you directly from the Outreach Department.  You should take the physician order form and the  to your home laboratory when it is time to for this testing to be done.  Additional mailers can be obtained by calling the Transplant Office and asking to speak to a kidney . I will let you know when to start having these labs drawn and sent in.      During this waiting period, we may request additional periodic laboratory tests with your primary physician.  It will be your responsibility to remind your physician to forward your results to the Transplant Office.      We need to be kept informed of any changes in your medical condition such as:    o changes in your medications,   o significant changes in your health  o significant infections (such as pneumonia or abscesses)  o blood transfusions  o any condition which requires  hospitalization  o any surgery  o If you start dialysis       Remember to complete any routine cancer screening tests required before your transplant.  This includes colonoscopy; prostrate screening for men, and mammogram and gynecologic testing for women, as well as dental work.  Your primary care clinic can assist you with arranging for these exams.  Remind your caregivers to forward copies of the records and final reports. You will receive a transplant summary listing all the items we will need to accomplish before you can be placed on the ACTIVE list.     We want you to know that our program has physician and surgeon coverage 24 hours a day, 365 days a year. If this coverage changes or there are substantial program changes, you will be notified in writing by letter.     Attached is a letter from the [employer] ([employer]). It describes the services and information offered to patients by [employer] and the Organ Procurement and Transplantation Network.    We appreciate having had the opportunity to participate in your care.  If you have questions, please feel free to call the Transplant Office at [phone] or [phone].      Sincerely,       Kidney Transplant Program    Enclosures: Telephone Contact List, Travel Resources, [employer] Letter, Waitlist Information Update and While You Are Waiting  CC:   Teddy Devine

## 2019-09-05 ENCOUNTER — TELEPHONE (OUTPATIENT)
Dept: TRANSPLANT | Facility: CLINIC | Age: 59
End: 2019-09-05

## 2019-09-05 NOTE — TELEPHONE ENCOUNTER
Called Harshal to discuss the outcome of the Selection Committee from yesterday 9/4/2019. He is approved for kidney alone at this point and will be listed as inactive to capture waiting time. He has a qualifying GFR of 11 on 8/29/2019. He has at least 50 lbs to lose per Dr Finger and pancreas transplant will be addressed at that time. He will need the following items: cardiology, bariatrics, chest CT, renal ultrasound, physical therapy through his PCP to improve functional status, repeat lupus in 12 weeks, colonoscopy and dental. He also needs Hepatitis B and pneumovax. Transplant summary letter and inactive listing letter will be sent.

## 2019-09-05 NOTE — TELEPHONE ENCOUNTER
Patient was added onto the  donor kidney transplant wait list today 2019 as inactive status due to BMI and needing to complete his evaluation. He has a qualifying GFR of 11 on 2019. Inactive listing letter has been sent.

## 2019-09-11 ENCOUNTER — TELEPHONE (OUTPATIENT)
Dept: TRANSPLANT | Facility: CLINIC | Age: 59
End: 2019-09-11

## 2019-09-13 NOTE — TELEPHONE ENCOUNTER
RECORDS RECEIVED FROM: Internal/Care Everywhere   DATE RECEIVED: 10-14-19   NOTES STATUS DETAILS   OFFICE NOTE from referring provider    Internal SOT   OFFICE NOTE from other cardiologist    N/A    DISCHARGE SUMMARY from hospital    N/A    DISCHARGE REPORT from the ER   N/A    OPERATIVE REPORT    N/A    MEDICATION LIST   Internal    LABS     BMP   Care Everywhere 9-3-19   CBC   Care Everywhere 10-9-18   CMP   Internal 8-29-19   Lipids   Internal    TSH   Care Everywhere 3-5-19   DIAGNOSTIC PROCEDURES     EKG   In process    Monitor Reports   N/A    IMAGING (DISC & REPORT)      Echo   In process    Stress Tests   N/A    Cath   N/A    MRI/MRA   N/A    CT/CTA   N/A      Action    Action Taken 9-13: Sent request to [facility] for ekg 10-16-18  9-13: Sent request to [facility] for 12-6-18 echo  10-11: Sent second request to [facility] for echo  10-11: sent second request to [facility] for EKG

## 2019-10-14 ENCOUNTER — OFFICE VISIT (OUTPATIENT)
Dept: CARDIOLOGY | Facility: CLINIC | Age: 59
End: 2019-10-14
Attending: INTERNAL MEDICINE
Payer: MEDICARE

## 2019-10-14 ENCOUNTER — OFFICE VISIT (OUTPATIENT)
Dept: TRANSPLANT | Facility: CLINIC | Age: 59
End: 2019-10-14
Attending: NURSE PRACTITIONER
Payer: MEDICARE

## 2019-10-14 ENCOUNTER — ANCILLARY PROCEDURE (OUTPATIENT)
Dept: ULTRASOUND IMAGING | Facility: CLINIC | Age: 59
End: 2019-10-14
Attending: NURSE PRACTITIONER
Payer: MEDICARE

## 2019-10-14 ENCOUNTER — ANCILLARY PROCEDURE (OUTPATIENT)
Dept: CT IMAGING | Facility: CLINIC | Age: 59
End: 2019-10-14
Attending: NURSE PRACTITIONER
Payer: MEDICARE

## 2019-10-14 ENCOUNTER — PRE VISIT (OUTPATIENT)
Dept: CARDIOLOGY | Facility: CLINIC | Age: 59
End: 2019-10-14

## 2019-10-14 VITALS
WEIGHT: 248 LBS | HEART RATE: 68 BPM | BODY MASS INDEX: 36.73 KG/M2 | OXYGEN SATURATION: 96 % | HEIGHT: 69 IN | SYSTOLIC BLOOD PRESSURE: 137 MMHG | DIASTOLIC BLOOD PRESSURE: 74 MMHG

## 2019-10-14 VITALS
HEIGHT: 69 IN | SYSTOLIC BLOOD PRESSURE: 153 MMHG | DIASTOLIC BLOOD PRESSURE: 73 MMHG | HEART RATE: 69 BPM | WEIGHT: 248 LBS | BODY MASS INDEX: 36.73 KG/M2 | OXYGEN SATURATION: 97 %

## 2019-10-14 DIAGNOSIS — E66.01 CLASS 2 SEVERE OBESITY DUE TO EXCESS CALORIES WITH SERIOUS COMORBIDITY AND BODY MASS INDEX (BMI) OF 36.0 TO 36.9 IN ADULT (H): Primary | ICD-10-CM

## 2019-10-14 DIAGNOSIS — Z76.82 ORGAN TRANSPLANT CANDIDATE: ICD-10-CM

## 2019-10-14 DIAGNOSIS — I12.9 RENAL HYPERTENSION: Primary | ICD-10-CM

## 2019-10-14 DIAGNOSIS — N28.1 COMPLEX RENAL CYST: ICD-10-CM

## 2019-10-14 DIAGNOSIS — I25.10 ATHEROSCLEROSIS OF NATIVE CORONARY ARTERY OF NATIVE HEART, ANGINA PRESENCE UNSPECIFIED: ICD-10-CM

## 2019-10-14 DIAGNOSIS — R06.09 EXERTIONAL DYSPNEA: ICD-10-CM

## 2019-10-14 DIAGNOSIS — E66.812 CLASS 2 SEVERE OBESITY DUE TO EXCESS CALORIES WITH SERIOUS COMORBIDITY AND BODY MASS INDEX (BMI) OF 36.0 TO 36.9 IN ADULT (H): Primary | ICD-10-CM

## 2019-10-14 DIAGNOSIS — Z76.82 AWAITING ORGAN TRANSPLANT: ICD-10-CM

## 2019-10-14 PROCEDURE — 99205 OFFICE O/P NEW HI 60 MIN: CPT | Mod: GC | Performed by: INTERNAL MEDICINE

## 2019-10-14 PROCEDURE — 93010 ELECTROCARDIOGRAM REPORT: CPT | Mod: ZP | Performed by: INTERNAL MEDICINE

## 2019-10-14 PROCEDURE — 93005 ELECTROCARDIOGRAM TRACING: CPT | Mod: ZF

## 2019-10-14 PROCEDURE — G0463 HOSPITAL OUTPT CLINIC VISIT: HCPCS | Mod: 25,ZF

## 2019-10-14 ASSESSMENT — MIFFLIN-ST. JEOR
SCORE: 1932.36
SCORE: 1940.3

## 2019-10-14 ASSESSMENT — PAIN SCALES - GENERAL: PAINLEVEL: NO PAIN (0)

## 2019-10-14 NOTE — LETTER
10/14/2019      RE: Dioni Gray  [address] [address] [address]       Dear Colleague,    Thank you for the opportunity to participate in the care of your patient, Dioni Gray, at the [facility]. Please see a copy of my visit note below.    HPI:   60 y/o male with PMHx significant for DM2, ESRD secondary to diabetic nephropathy, morbid obesity, HLD and HTN who comes in to undergo cardiac evaluation as part of renal transplant listing.     Patient was recently seen by our transplant team and was listed as inactive for kidney transplant. Patient needs to lose 50lbs and be seen by bariatric surgery. Patient continues to make urine and has not needed dialysis. He denies shortness of breath at rest, but does dyspnea on exertion. Does not have chest pain at rest or exertion. He also denies fever, chills,night sweats, headache, sore throat, coughing, abdominal pain, nausea, vomiting, diarrhea, constipation, hematochezia, melena, dysuria, hematuria, joint pain, joint swelling, PND, orthopnea, presyncope or syncope.     Patient has about a 15-20 pack year history of smoking, but quit more than 10 years ago. Denies alcohol use or illicit substances.     Family history is significant for mother with DM and sister with malignancy that he does not remember primary site. Otherwise negative family history.     PAST MEDICAL HISTORY:  Past Medical History:   Diagnosis Date     Anemia in chronic kidney disease      Arthritis      Former tobacco use      Hypertension      Obesity      PAD (peripheral artery disease) (H)      Thyroid disease      Type 2 diabetes mellitus (H)        CURRENT MEDICATIONS:  Current Outpatient Medications   Medication Sig Dispense Refill     aspirin 81 MG EC tablet Take 81 mg by mouth       calcitRIOL (ROCALTROL) 0.25 MCG capsule Take 2 tablets on Monday, Wed and Friday 1 tablet on all other days.  3     carvedilol (COREG) 12.5 MG  tablet Take 1 tablet by mouth 2 times daily with meals.  0     cloNIDine (CATAPRES) 0.1 MG tablet Take 0.1 mg by mouth       furosemide (LASIX) 40 MG tablet TAKE 3 TABLETS BY MOUTH TWICE DAILY  0     insulin aspart (NOVOLOG PEN) 100 UNIT/ML pen Inject 2 Units Subcutaneous       insulin detemir (LEVEMIR PEN) 100 UNIT/ML pen Inject 10 Units Subcutaneous       insulin pen needle (FIFTY50 PEN NEEDLES) 32G X 4 MM miscellaneous As directed. Use to inject insulin daily (using up to 4 needles per day)       levothyroxine (SYNTHROID/LEVOTHROID) 88 MCG tablet Take 88 mcg by mouth       NIFEdipine ER OSMOTIC (PROCARDIA XL) 30 MG 24 hr tablet Take 30 mg by mouth       sevelamer (RENAGEL) 400 MG tablet Take 400 mg by mouth       acetaminophen (TYLENOL) 325 MG tablet Take 325-650 mg by mouth       metolazone (ZAROXOLYN) 2.5 MG tablet TAKE 1 TABLET BY MOUTH ONCE WEEKLY ON wednesdays IF needed  3       PAST SURGICAL HISTORY:  Past Surgical History:   Procedure Laterality Date     ABDOMEN SURGERY      Appendix removed 2018     AMPUTATION      Left Foot, Fourth Digit      APPENDECTOMY      2018     HERNIA REPAIR      Mesh Used        ALLERGIES     Allergies   Allergen Reactions     Sulfamethoxazole-Trimethoprim Unknown     Sodium Hypochlorite Rash       FAMILY HISTORY:  Family History   Problem Relation Age of Onset     Diabetes Father      Lung Cancer Sister        SOCIAL HISTORY:  Social History     Socioeconomic History     Marital status:      Spouse name: None     Number of children: None     Years of education: None     Highest education level: None   Occupational History     None   Social Needs     Financial resource strain: None     Food insecurity:     Worry: None     Inability: None     Transportation needs:     Medical: None     Non-medical: None   Tobacco Use     Smoking status: Former Smoker     Types: Cigarettes     Smokeless tobacco: Never Used     Tobacco comment: Quit Smoking 20 Years Ago    Substance and  "Sexual Activity     Alcohol use: Yes     Comment: Occasional/Very Rarely      Drug use: Never     Sexual activity: None   Lifestyle     Physical activity:     Days per week: None     Minutes per session: None     Stress: None   Relationships     Social connections:     Talks on phone: None     Gets together: None     Attends [employer] service: None     Active member of club or organization: None     Attends meetings of clubs or organizations: None     Relationship status: None     Intimate partner violence:     Fear of current or ex partner: None     Emotionally abused: None     Physically abused: None     Forced sexual activity: None   Other Topics Concern     Parent/sibling w/ CABG, MI or angioplasty before 65F 55M? Not Asked   Social History Narrative     None       ROS:   Constitutional: No fever, chills, or sweats. No weight gain/loss   ENT: No visual disturbance, ear ache, epistaxis, sore throat  Allergies/Immunologic: Negative.   Respiratory: No cough, hemoptysia  Cardiovascular: As per HPI  GI: No nausea, vomiting, hematemesis, melena, or hematochezia  : No urinary frequency, dysuria, or hematuria  Integument: Negative  Psychiatric: Negative  Neuro: Negative  Endocrinology: Negative   Musculoskeletal: Negative    EXAM:  /74 (BP Location: Right arm, Patient Position: Chair, Cuff Size: Adult Regular)   Pulse 68   Ht 1.74 m (5' 8.5\")   Wt 112.5 kg (248 lb)   SpO2 96%   BMI 37.16 kg/m     In general, the patient is a pleasant male in no apparent distress.      HEENT: NC/AT.  PERRLA.  EOMI.  Sclerae white, not injected.    Neck: Carotids 2+ bilaterally without bruits.  No jugular venous distension.   Lymph: No cervical adenopathy. No thyromegaly.   Heart: RRR. Normal S1, S2. No murmur, rub, click, or gallop. There is no heave.    Lungs: Clear bilaterally.  No rhonchi, wheezes, rales.   GI: Soft, nontender, nondistended, obese   Extremities: No edema.  The pulses are 2+at the radial and DP " bilaterally.  Neuro: grossly non focal.   Skin: no rashes.  Endocrine: no thyromegaly  Musculoskeletal: no joint swelling or tenderness, gait normal.  Psych: pleasant and conversant      Labs:  LIPID RESULTS:  No results found for: CHOL, HDL, LDL, TRIG, CHOLHDLRATIO, NHDL    LIVER ENZYME RESULTS:  Lab Results   Component Value Date    AST 22 08/29/2019    ALT 30 08/29/2019       CBC RESULTS:  Lab Results   Component Value Date    WBC 8.6 08/29/2019    RBC 3.41 (L) 08/29/2019    HGB 10.4 (L) 08/29/2019    HCT 30.9 (L) 08/29/2019    MCV 91 08/29/2019    MCH 30.5 08/29/2019    MCHC 33.7 08/29/2019    RDW 13.1 08/29/2019     08/29/2019       BMP RESULTS:  Lab Results   Component Value Date     08/29/2019    POTASSIUM 4.0 08/29/2019    CHLORIDE 103 08/29/2019    CO2 26 08/29/2019    ANIONGAP 9 08/29/2019     (H) 08/29/2019    BUN 98 (H) 08/29/2019    CR 5.39 (H) 08/29/2019    GFRESTIMATED 11 (L) 08/29/2019    GFRESTBLACK 13 (L) 08/29/2019    SVETLANA 8.8 08/29/2019        A1C RESULTS:  Lab Results   Component Value Date    A1C 8.2 (H) 08/29/2019       INR RESULTS:  Lab Results   Component Value Date    INR 1.01 08/29/2019       Procedures:    ECG today - NSR      Echo 8/29/19    Normal biventricular size and systolic function. LVEF 55-60%  No significant valve disease.  IVC is not dilated.      Assessment and Plan:   #Morbid obesity  #Former smoker  #HLD  #HTN  #Dyspnea on exertion  Patient does have risk factors for CAD which include morbid obesity, DM, former heavy smoker and HDL. Last lipid panel was in 2017 that showed . He is not on a statin, but is taking aspirin. Because of his risk factors we recommend stress test with dobutamine study as patient might not be able to exercise well because of body habitus. Also recommend fasting lipid panel for CVD risk assessment.   - Lipid panel pending  - Dobutamine stress echo  - Continue BB, nifedipine and diuretics at current doses  - Statin to be  discussed during follow up and with labs    Patient evaluated and discussed with Dr. Reyes.     Raymond Stallworth MD PhD  Cardiology Fellow      ATTENDING ATTESTATION:  This patient has been seen and examined by me October 14, 2019 with Raymond Stallworth MD, cardiology fellow. I have reviewed the vitals, laboratory and imaging data relevant to this patient's care. I have edited this note to reflect our joint assessment and plan, and discussed the plan with the patient.  ESRD, not on dialysis  Renal hypertension  Insulin requiring DM II  Hypothyroidism  Past tobacco use  Morbid obesity  Post Left foot toe amputation  Moderate coronary calcification on chest CT     Mr. Stacy is minimally communicative in clinic today.  He reports no prior history of coronary artery disease, heart failure or arrhythmias.  He has several cardiac risk factors with evidence of coronary atherosclerosis on chest CT.  He denies any anginal heart failure symptoms.  His exercise tolerance is mainly limited by his diabetic foot problems. he is currently not on dialysis.   On exam his blood pressure is well controlled his heart rate is in the 60s and he is euvolemic on exam. I have reviewed and discussed with him his ECG from today which is notable for normal sinus rhythm.  His echocardiogram from 8/29/2019 is notable for normal biventricular size and function without any significant valvular disease. Given his risk factor burden, and since he is not on dialysis yet, we will proceed with a dobutamine stress echo for further cardiac risk stratification and also recommend lipid panel with his next blood draw.  In the event the stress test is significantly abnormal, we will have to proceed with a coronary angiogram for further risk stratification and address the the need for dialysis at that point.    Radha Reyes MD, MS  Staff Cardiologist  Pager: [phone]    CC  Patient Care Team:  Jaleel Pitts as PCP - General (Family Practice)  Alfonso  Teddy ZAMBRANO MD as MD (Nephrology)  SELF, REFERRED

## 2019-10-14 NOTE — NURSING NOTE
Chief Complaint   Patient presents with     New Patient     Transplant eval, 60 yo male     Vitals were taken and medications were reconciled. EKG was performed    Nikki PATEL  10:39 AM

## 2019-10-14 NOTE — PROGRESS NOTES
PATIENT:  Dioni Gray  :          1960  MERY:          10/14/2019    The patient is seen at the consultation request of Dr. Arreola for obesity associated with DM-2.    Patient is pursuing transplant of kidney and possibly pancreas and needs to lose to BMI <35 pounds prior to surgery.    He describes his weight history as a gradual gain. His weight gain started as an adult.    His previous attempts at weight loss include a self-imposed calorie restriction. Patient has had some success losing from 280 lbs to 248 lbs today.    Today's weight: 248 lbs 0 oz  Current Body Mass Index:  Body mass index is 36.62 kg/m .    Most weight loss:  32 lbs.  Lowest weight since an adult:  220 lbs.  Highest weight:  280 lbs.    Work/Social History: Dioni is on disablitity for DM and CKD. He describes his home life as stable. Resides in an independent environment. His marital status is . He has an overweight/obese spouse. He reports that he has quit smoking. His smoking use included cigarettes. He has never used smokeless tobacco. He reports current alcohol use. He reports that he does not use drugs.    Food Life: Dioni eats a high carb diet, uses food as a reward and uses food as mood management.     Diet Recall  Breakfast Sometimes skips; Toast with butter and jelly; poptart; August and eggs; Hashbrowns with gravy   Lunch [place] or soup   Dinner Steak and corn; hamburger; hotdogs; Spaghetti; potatoes and ham    Snacks Chips, cookies   Beverages Diet soda, diet jabier aid, diet cranberry juice, black coffee   Alcohol Estimates he's drank a total of one 12-pack over the entire summer    \    Dioni has an activity pattern that is moderately active but has no structured exercise program.     ASSESSMENT:  Dioni is a patient with early onset obesity with significant element of familial/genetic influence and with current health consequences. He does need aggressive weight loss plan due to BMI 36 and need for weight  loss to BMI <35 for kidney transplant and BMI <32 for kidney and pancreas.    His problem is complicated by strong craving/reward pathways      PLAN:      Follow up in medical weight management clinic for return MW visit with Cari Hancock and dietitian.  Call [phone].    Discussed topiramate. Patient would like to continue working on weight loss without surgery or medications.         RTC:    4 weeks.

## 2019-10-14 NOTE — LETTER
10/14/2019       RE: Dioni Gray  [id number] [address] [address]     Dear Colleague,    Thank you for referring your patient, Dioni Gray, to the [facility] at [facility]. Please see a copy of my visit note below.    PATIENT:  Dioni Gray  :          1960  MERY:          10/14/2019    The patient is seen at the consultation request of Dr. Arreola for obesity associated with DM-2.    Patient is pursuing transplant of kidney and possibly pancreas and needs to lose to BMI <35 pounds prior to surgery.    He describes his weight history as a gradual gain. His weight gain started as an adult.    His previous attempts at weight loss include a self-imposed calorie restriction. Patient has had some success losing from 280 lbs to 248 lbs today.    Today's weight: 248 lbs 0 oz  Current Body Mass Index:  Body mass index is 36.62 kg/m .    Most weight loss:  32 lbs.  Lowest weight since an adult:  220 lbs.  Highest weight:  280 lbs.    Work/Social History: Dioni is on disablitity for DM and CKD. He describes his home life as stable. Resides in an independent environment. His marital status is . He has an overweight/obese spouse. He reports that he has quit smoking. His smoking use included cigarettes. He has never used smokeless tobacco. He reports current alcohol use. He reports that he does not use drugs.    Food Life: Dioni eats a high carb diet, uses food as a reward and uses food as mood management.     Diet Recall  Breakfast Sometimes skips; Toast with butter and jelly; poptart; August and eggs; Hashbrowns with gravy   Lunch [place] or soup   Dinner Steak and corn; hamburger; hotdogs; Spaghetti; potatoes and ham    Snacks Chips, cookies   Beverages Diet soda, diet jabier aid, diet cranberry juice, black coffee   Alcohol Estimates he's drank a total of one 12-pack over the entire summer    \    Dioni has an activity pattern that  is moderately active but has no structured exercise program.     ASSESSMENT:  Dioni is a patient with early onset obesity with significant element of familial/genetic influence and with current health consequences. He does need aggressive weight loss plan due to BMI 36 and need for weight loss to BMI <35 for kidney transplant and BMI <32 for kidney and pancreas.    His problem is complicated by strong craving/reward pathways      PLAN:      Follow up in medical weight management clinic for return MW visit with Cari Hancock and dietitian.  Call [phone].    Discussed topiramate. Patient would like to continue working on weight loss without surgery or medications.         RTC:    4 weeks.    Again, thank you for allowing me to participate in the care of your patient.      Sincerely,    Cari Hancock PA-C

## 2019-10-14 NOTE — PATIENT INSTRUCTIONS
Patient Instructions:  It was a pleasure to see you in the cardiology clinic today.      If you have any questions, call  Mackenzie Mott RN, at [phone].  Press Option #1 for the [facility], and then press Option #4  We are encouraging the use of [employer] to communicate with your HealthCare Provider    Note the new or changes to your medications: None  Stop the following medications: None    The results from today include: None  Please follow up with Dr. Reyes as needed, please have a dobutamine stress echo and  Your cholesterol checked.       If you have an urgent need after hours (8:00 am to 4:30 pm) please call [phone] and ask for the cardiology fellow on call.

## 2019-10-14 NOTE — PROGRESS NOTES
HPI:   60 y/o male with PMHx significant for DM2, ESRD secondary to diabetic nephropathy, morbid obesity, HLD and HTN who comes in to undergo cardiac evaluation as part of renal transplant listing.     Patient was recently seen by our transplant team and was listed as inactive for kidney transplant. Patient needs to lose 50lbs and be seen by bariatric surgery. Patient continues to make urine and has not needed dialysis. He denies shortness of breath at rest, but does dyspnea on exertion. Does not have chest pain at rest or exertion. He also denies fever, chills,night sweats, headache, sore throat, coughing, abdominal pain, nausea, vomiting, diarrhea, constipation, hematochezia, melena, dysuria, hematuria, joint pain, joint swelling, PND, orthopnea, presyncope or syncope.     Patient has about a 15-20 pack year history of smoking, but quit more than 10 years ago. Denies alcohol use or illicit substances.     Family history is significant for mother with DM and sister with malignancy that he does not remember primary site. Otherwise negative family history.     PAST MEDICAL HISTORY:  Past Medical History:   Diagnosis Date     Anemia in chronic kidney disease      Arthritis      Former tobacco use      Hypertension      Obesity      PAD (peripheral artery disease) (H)      Thyroid disease      Type 2 diabetes mellitus (H)        CURRENT MEDICATIONS:  Current Outpatient Medications   Medication Sig Dispense Refill     aspirin 81 MG EC tablet Take 81 mg by mouth       calcitRIOL (ROCALTROL) 0.25 MCG capsule Take 2 tablets on Monday, Wed and Friday 1 tablet on all other days.  3     carvedilol (COREG) 12.5 MG tablet Take 1 tablet by mouth 2 times daily with meals.  0     cloNIDine (CATAPRES) 0.1 MG tablet Take 0.1 mg by mouth       furosemide (LASIX) 40 MG tablet TAKE 3 TABLETS BY MOUTH TWICE DAILY  0     insulin aspart (NOVOLOG PEN) 100 UNIT/ML pen Inject 2 Units Subcutaneous       insulin detemir (LEVEMIR PEN) 100  UNIT/ML pen Inject 10 Units Subcutaneous       insulin pen needle (FIFTY50 PEN NEEDLES) 32G X 4 MM miscellaneous As directed. Use to inject insulin daily (using up to 4 needles per day)       levothyroxine (SYNTHROID/LEVOTHROID) 88 MCG tablet Take 88 mcg by mouth       NIFEdipine ER OSMOTIC (PROCARDIA XL) 30 MG 24 hr tablet Take 30 mg by mouth       sevelamer (RENAGEL) 400 MG tablet Take 400 mg by mouth       acetaminophen (TYLENOL) 325 MG tablet Take 325-650 mg by mouth       metolazone (ZAROXOLYN) 2.5 MG tablet TAKE 1 TABLET BY MOUTH ONCE WEEKLY ON wednesdays IF needed  3       PAST SURGICAL HISTORY:  Past Surgical History:   Procedure Laterality Date     ABDOMEN SURGERY      Appendix removed 2018     AMPUTATION      Left Foot, Fourth Digit      APPENDECTOMY      2018     HERNIA REPAIR      Mesh Used        ALLERGIES     Allergies   Allergen Reactions     Sulfamethoxazole-Trimethoprim Unknown     Sodium Hypochlorite Rash       FAMILY HISTORY:  Family History   Problem Relation Age of Onset     Diabetes Father      Lung Cancer Sister        SOCIAL HISTORY:  Social History     Socioeconomic History     Marital status:      Spouse name: None     Number of children: None     Years of education: None     Highest education level: None   Occupational History     None   Social Needs     Financial resource strain: None     Food insecurity:     Worry: None     Inability: None     Transportation needs:     Medical: None     Non-medical: None   Tobacco Use     Smoking status: Former Smoker     Types: Cigarettes     Smokeless tobacco: Never Used     Tobacco comment: Quit Smoking 20 Years Ago    Substance and Sexual Activity     Alcohol use: Yes     Comment: Occasional/Very Rarely      Drug use: Never     Sexual activity: None   Lifestyle     Physical activity:     Days per week: None     Minutes per session: None     Stress: None   Relationships     Social connections:     Talks on phone: None     Gets together: None     " Attends Rastafari service: None     Active member of club or organization: None     Attends meetings of clubs or organizations: None     Relationship status: None     Intimate partner violence:     Fear of current or ex partner: None     Emotionally abused: None     Physically abused: None     Forced sexual activity: None   Other Topics Concern     Parent/sibling w/ CABG, MI or angioplasty before 65F 55M? Not Asked   Social History Narrative     None       ROS:   Constitutional: No fever, chills, or sweats. No weight gain/loss   ENT: No visual disturbance, ear ache, epistaxis, sore throat  Allergies/Immunologic: Negative.   Respiratory: No cough, hemoptysia  Cardiovascular: As per HPI  GI: No nausea, vomiting, hematemesis, melena, or hematochezia  : No urinary frequency, dysuria, or hematuria  Integument: Negative  Psychiatric: Negative  Neuro: Negative  Endocrinology: Negative   Musculoskeletal: Negative    EXAM:  /74 (BP Location: Right arm, Patient Position: Chair, Cuff Size: Adult Regular)   Pulse 68   Ht 1.74 m (5' 8.5\")   Wt 112.5 kg (248 lb)   SpO2 96%   BMI 37.16 kg/m    In general, the patient is a pleasant male in no apparent distress.      HEENT: NC/AT.  PERRLA.  EOMI.  Sclerae white, not injected.    Neck: Carotids 2+ bilaterally without bruits.  No jugular venous distension.   Lymph: No cervical adenopathy. No thyromegaly.   Heart: RRR. Normal S1, S2. No murmur, rub, click, or gallop. There is no heave.    Lungs: Clear bilaterally.  No rhonchi, wheezes, rales.   GI: Soft, nontender, nondistended, obese   Extremities: No edema.  The pulses are 2+at the radial and DP bilaterally.  Neuro: grossly non focal.   Skin: no rashes.  Endocrine: no thyromegaly  Musculoskeletal: no joint swelling or tenderness, gait normal.  Psych: pleasant and conversant      Labs:  LIPID RESULTS:  No results found for: CHOL, HDL, LDL, TRIG, CHOLHDLRATIO, NHDL    LIVER ENZYME RESULTS:  Lab Results   Component Value " Date    AST 22 08/29/2019    ALT 30 08/29/2019       CBC RESULTS:  Lab Results   Component Value Date    WBC 8.6 08/29/2019    RBC 3.41 (L) 08/29/2019    HGB 10.4 (L) 08/29/2019    HCT 30.9 (L) 08/29/2019    MCV 91 08/29/2019    MCH 30.5 08/29/2019    MCHC 33.7 08/29/2019    RDW 13.1 08/29/2019     08/29/2019       BMP RESULTS:  Lab Results   Component Value Date     08/29/2019    POTASSIUM 4.0 08/29/2019    CHLORIDE 103 08/29/2019    CO2 26 08/29/2019    ANIONGAP 9 08/29/2019     (H) 08/29/2019    BUN 98 (H) 08/29/2019    CR 5.39 (H) 08/29/2019    GFRESTIMATED 11 (L) 08/29/2019    GFRESTBLACK 13 (L) 08/29/2019    SVETLANA 8.8 08/29/2019        A1C RESULTS:  Lab Results   Component Value Date    A1C 8.2 (H) 08/29/2019       INR RESULTS:  Lab Results   Component Value Date    INR 1.01 08/29/2019       Procedures:    ECG today - NSR      Echo 8/29/19    Normal biventricular size and systolic function. LVEF 55-60%  No significant valve disease.  IVC is not dilated.      Assessment and Plan:   #Morbid obesity  #Former smoker  #HLD  #HTN  #Dyspnea on exertion  Patient does have risk factors for CAD which include morbid obesity, DM, former heavy smoker and HDL. Last lipid panel was in 2017 that showed . He is not on a statin, but is taking aspirin. Because of his risk factors we recommend stress test with dobutamine study as patient might not be able to exercise well because of body habitus. Also recommend fasting lipid panel for CVD risk assessment.   - Lipid panel pending  - Dobutamine stress echo  - Continue BB, nifedipine and diuretics at current doses  - Statin to be discussed during follow up and with labs    Patient evaluated and discussed with Dr. Reyes.     Raymond Stallworth MD PhD  Cardiology Fellow      ATTENDING ATTESTATION:  This patient has been seen and examined by me October 14, 2019 with Raymond Stallworth MD, cardiology fellow. I have reviewed the vitals, laboratory and imaging data  relevant to this patient's care. I have edited this note to reflect our joint assessment and plan, and discussed the plan with the patient.    ESRD, not on dialysis  Renal hypertension  Insulin requiring DM II  Hypothyroidism  Past tobacco use  Morbid obesity  Post Left foot toe amputation  Moderate coronary calcification on chest CT     Mr. Stacy is minimally communicative in clinic today.  He reports no prior history of coronary artery disease, heart failure or arrhythmias.  He has several cardiac risk factors with evidence of coronary atherosclerosis on chest CT.  He denies any anginal heart failure symptoms.  His exercise tolerance is mainly limited by his diabetic foot problems. he is currently not on dialysis.   On exam his blood pressure is well controlled his heart rate is in the 60s and he is euvolemic on exam. I have reviewed and discussed with him his ECG from today which is notable for normal sinus rhythm.  His echocardiogram from 8/29/2019 is notable for normal biventricular size and function without any significant valvular disease. Given his risk factor burden, and since he is not on dialysis yet, we will proceed with a dobutamine stress echo for further cardiac risk stratification and also recommend lipid panel with his next blood draw.  In the event the stress test is significantly abnormal, we will have to proceed with a coronary angiogram for further risk stratification and address the the need for dialysis at that point.     Radha Reyes MD, MS  Staff Cardiologist  Pager: [phone]        CC  Patient Care Team:  Jaleel Pitts as PCP - General (Family Practice)  Teddy Hamilton MD as MD (Nephrology)  SELF, REFERRED

## 2019-10-14 NOTE — PATIENT INSTRUCTIONS
Follow up in medical weight management clinic for return [facility] visit with Cari Hancock and dietitian.  Call [phone].    Discussed topiramate. Patient would like to continue working on weight loss without surgery or medications.       MEDICATION DISCUSSED AT THIS APPOINTMENT  We are considering starting topiramate at bedtime.  Start one tab, 25 mg, for a week. Go up to 50 mg (2 tabs) for the next week. At the third week, take  3 tabs (75 mg).  Stay at 3 tabs until you are seen again.     For any questions/concerns contact Amanda Kendall LPN at [phone] or Kacie Abarca RN at [phone]    (Do not stop taking it if you don't think it's working. For some people it works even though they do not feel much different.)    Topiramate (Topamax) is a medication that is used most often to treat migraine headaches or for seizures. It has also been found to help with weight loss. Although it's not currently FDA approved for weight loss, it has been used safely for a number of years to help people who are carrying extra weight.     Just how topiramate helps with weight loss has not been exactly determined. However it seems to work on areas of the brain to quiet down signals related to eating.      Topiramate may make you:    >feel less interest in eating in between meals   >think less about food and eating   >find it easier to push the plate away   >find giving up pop easier    >have an easier time eating less    For some of our patients, the pills work right away. They feel and think quite differently about food. Other patients don't feel much of a change but find in fact they have lost weight! Like all weight loss medications, topiramate works best when you help it work.  This means:    1) Have less tempting high calorie (fattening) food around the house or office    2) Have lower calorie food (fruits, vegetables,low fat meats and dairy) for snacks    3) Eat out only one time or less each week.   4) Eat your meals at  a table with the TV or computer off.    Side-effects. Topiramate is generally well tolerated. The main side-effects we see are:   Tingling in hands,feet, or face (usually not very troublesome)   Mental confusion and word finding trouble (about 10% of patients have this.)     Feeling sleepy or a bit dopey- this goes away very soon after starting.    One of the dangers of topiramate is the possibility of birth defects--if you get pregnant when you are on it, there is the risk that your baby will be born with a cleft lip or palate.  If you are on topiramate and of child bearing age, you need to be on a reliable form of birth control or refrain from sexual intercourse.     Please refer to the pharmacy insert for more information on side-effects. Since many pharmacists are not familiar with the use of topiramate in weight loss, calling the clinic will get you the most accurate information on the use of this medication for weight loss.     In order to get refills of this or any medication we prescribe you must be seen in the medical weight mgmt clinic every 2-3 months. Please have your pharmacy fax a refill request to [phone].

## 2019-10-15 LAB — INTERPRETATION ECG - MUSE: NORMAL

## 2019-10-22 ENCOUNTER — TELEPHONE (OUTPATIENT)
Dept: TRANSPLANT | Facility: CLINIC | Age: 59
End: 2019-10-22

## 2019-10-22 DIAGNOSIS — N28.1 COMPLEX RENAL CYST: Primary | ICD-10-CM

## 2019-10-22 NOTE — TELEPHONE ENCOUNTER
Called Que to let him know the renal ultrasound shows a slight increase in the cyst in his right kidney and we would like him to see urology. Our  will call him with this appointment. He had no further questions.

## 2019-11-01 NOTE — TELEPHONE ENCOUNTER
Called [facility] for Urol appt on  Fri 12/6 at 330pm w/A. Corwin, mailing letter also

## 2019-11-04 NOTE — TELEPHONE ENCOUNTER
MEDICAL RECORDS REQUEST   [facility]  Urology Clinic  [address] [place] [address]  PHONE: [phone]  Fax: [phone]        FUTURE VISIT INFORMATION                                                   Dioni TEJEDA Marina, : 1960 scheduled for future visit at [facility]    APPOINTMENT INFORMATION:    Date: 19 3:30PM     Provider:  María Olivia MD    Reason for Visit/Diagnosis: Complex renal cyst     REFERRAL INFORMATION:    Referring provider:  ELIZABETH LEAL    Specialty: MD    Referring providers clinic:  [facility]     Clinic contact number:  [phone]    RECORDS REQUESTED FOR VISIT                                                     NOTES  STATUS/DETAILS   OFFICE NOTE from referring provider  yes   OFFICE NOTE from other specialist  yes   DISCHARGE SUMMARY from hospital  no   DISCHARGE REPORT from the ER  no   OPERATIVE REPORT  no   MEDICATION LIST  no   KIDNEY CYST     CT STONE  yes   US RENAL   yes     PRE-VISIT CHECKLIST      Record collection complete Yes- All recs in epic  Images in  PACS    Appointment appropriately scheduled           (right time/right provider) Yes   MyChart activation Yes   Questionnaire complete If no, please explain: In process      Completed by: Barbara Linares

## 2019-11-06 ENCOUNTER — DOCUMENTATION ONLY (OUTPATIENT)
Dept: CARE COORDINATION | Facility: CLINIC | Age: 59
End: 2019-11-06

## 2019-11-19 NOTE — TELEPHONE ENCOUNTER
Patient called wanting to cancel Urol appt on 12/6, due to having to drive 2 hours for one appt is ridiculous (this is what the patient said).  What would you like me to do, do you want to call him or just cancel?

## 2019-11-20 ENCOUNTER — TELEPHONE (OUTPATIENT)
Dept: TRANSPLANT | Facility: CLINIC | Age: 59
End: 2019-11-20

## 2019-11-20 NOTE — TELEPHONE ENCOUNTER
Received [employer] message from scheduling that Harshal does not want to make a 2 hour trip here to see urology on 12/6. Per Leonid Allen he may be seen locally. I tried to reach him but got his voice mail. I left the message that we will cancel the urology appointment here but now he is responsible to get an appointment locally with a urologist. I asked him to talk with his PCP for a suggestion on who he might see. I asked him to call me with the following information: name of provider, where the provider is located and when the appointment is scheduled so I can request records. Provided my contact information.

## 2019-12-06 ENCOUNTER — PRE VISIT (OUTPATIENT)
Dept: UROLOGY | Facility: CLINIC | Age: 59
End: 2019-12-06

## 2019-12-06 ENCOUNTER — DOCUMENTATION ONLY (OUTPATIENT)
Dept: TRANSPLANT | Facility: CLINIC | Age: 59
End: 2019-12-06

## 2019-12-18 ENCOUNTER — DOCUMENTATION ONLY (OUTPATIENT)
Dept: TRANSPLANT | Facility: CLINIC | Age: 59
End: 2019-12-18

## 2020-03-11 ENCOUNTER — HEALTH MAINTENANCE LETTER (OUTPATIENT)
Age: 60
End: 2020-03-11

## 2020-07-08 ENCOUNTER — COMMITTEE REVIEW (OUTPATIENT)
Dept: TRANSPLANT | Facility: CLINIC | Age: 60
End: 2020-07-08

## 2020-07-08 NOTE — COMMITTEE REVIEW
Abdominal Patient Discussion Note Transplant Coordinator: Erin Amaral  Transplant Surgeon:   Lokesh Arreola    Referring Physician: Teddy Hamilton    Committee Review Members:  Nephrology Aries Benjamin MD, Zander Swain MD   Nutrition Adina No, RD   Pharmacy Galdino Daly, [facility]    - Clinical Brandi Trena Singh, [facility]   Transplant Rosie Alford PA-C, Faina Bush, RN, Lokesh Arreola MD, Esther Jj, NP, Humera Hutchison, RN, Gardenia Hanley, RN, Maribell Maradiaga, RN, Day Devlin, RN, Clifford Mar MD, Erin Bradford, RN       Additional Discussion Notes and Findings:   Patient initial evaluation for kidney and kidney/pancreas transplant August 2019; listed inactive status on the kidney list while completing evaluation and given weight loss goal for kidney transplant.  Was also okayed to keep kidney/pancreas evaluation open.  Upon review of records, 03/18/2020 charted body mass index of 39.14, weight=257 pounds; patient actually has gained weight since transplant evaluation appointments.  Team recommends closing kidney/pancreas evaluation at this time and patient to continue to meet weight goal of 237 pounds and complete evaluation for kidney transplant.  If patient successful with weight loss to 237 pounds, may be re-evaluated for kidney/pancreas transplant.

## 2020-07-09 ENCOUNTER — TELEPHONE (OUTPATIENT)
Dept: TRANSPLANT | Facility: CLINIC | Age: 60
End: 2020-07-09

## 2020-07-09 NOTE — LETTER
July 9, 2020    Dioni Gray  [id number] [address] [place] [address]      Dear Mr. Gray,    Thank you for your interest in the kidney/pancreas transplant program.  Since you have progressed with your weight loss, our multi-disciplinary transplant team has recommended your evaluation be closed.  You will remain on the kidney transplant list, on inactive (hold) status, which means you will continue to accumulate waiting time, but are not eligible to receive calls regarding kidney offers.      Once you have met the weight goal of 237 pounds  and completed any remaining evaluation appointment/tests, you will be considered for Active status on the kidney waitlist.      If you have any questions, please contact the Transplant Office at [phone].    Sincerely,     Faina Bush RN, Transplant Coordinator  On Behalf of the Kidney/Pancreas Program  [facility]    CC: Care Team

## 2020-07-09 NOTE — TELEPHONE ENCOUNTER
"Called patient to inform since appears patient hasn't progressed with weight loss that the multi-disciplinary transplant team has recommended closing kidney/pancreas evaluation at this time.  When patient meets weight goal given for kidney transplant, he will be re-considered as kidney/pancreas transplant candidate and likely at that time will require re-evaluation appointments.  Since patient has had return visit with Cari MAXWELL in Medical Weight Management, offered clinic phone number for patient to schedule and he reports, \"Not at this time.\" Confirmed I will send patient a letter that kidney/pancreas evaluation has been closed.  Patient verbalizes agreement with this plan.   "

## 2021-01-03 ENCOUNTER — HEALTH MAINTENANCE LETTER (OUTPATIENT)
Age: 61
End: 2021-01-03

## 2021-04-25 ENCOUNTER — HEALTH MAINTENANCE LETTER (OUTPATIENT)
Age: 61
End: 2021-04-25

## 2021-08-15 ENCOUNTER — HEALTH MAINTENANCE LETTER (OUTPATIENT)
Age: 61
End: 2021-08-15

## 2021-10-10 ENCOUNTER — HEALTH MAINTENANCE LETTER (OUTPATIENT)
Age: 61
End: 2021-10-10

## 2021-12-05 ENCOUNTER — HEALTH MAINTENANCE LETTER (OUTPATIENT)
Age: 61
End: 2021-12-05

## 2022-02-17 ENCOUNTER — TELEPHONE (OUTPATIENT)
Dept: TRANSPLANT | Facility: CLINIC | Age: 62
End: 2022-02-17
Payer: MEDICARE

## 2022-02-17 NOTE — TELEPHONE ENCOUNTER
Called pt to check in on weight loss. Pt reports he has gained weight since starting HD. He is not interested in seeing weight loss management clinic and refused more information. Pt reports some frustration with his evaluation, stating he only wants to drive down to [place] if he has multiple things that needs to be completed on the same day. RNCC informed pt that he will need updated appts when he hits his weight goal of BMI <35 and we can try to arrange them on the same day. Pt verbalized understanding of information and has no further questions. Encouraged to reach out if questions arise.

## 2022-05-21 ENCOUNTER — HEALTH MAINTENANCE LETTER (OUTPATIENT)
Age: 62
End: 2022-05-21

## 2022-09-18 ENCOUNTER — HEALTH MAINTENANCE LETTER (OUTPATIENT)
Age: 62
End: 2022-09-18

## 2022-12-19 ENCOUNTER — TELEPHONE (OUTPATIENT)
Dept: TRANSPLANT | Facility: CLINIC | Age: 62
End: 2022-12-19

## 2022-12-19 NOTE — TELEPHONE ENCOUNTER
"Called HD center to check in on Primary nephrologists input on transplant candidacy after reviewing note regarding pt cutting runs short. Spoke with HD RN, Nahomy. Pt will cut runs short (daily), \"does what he wants\" with his meds, does not follow fluid restrictions. Pt will call off treatment around once every 4-6 weeks, does not make up treatment if he does call off. Message left with direct line for primary neph to call writer back.     12/19/22 addendum: Received call back from Dr. Rahman. Confirmed pt isn't compliant with staying for the full run. Pt typically doesn't talk a whole lot at the HD unit, typically has eyes closed while primary nephrologist is there. Medically, other than the pt being fluid up, nothing that would contraindicate transplant but compliance is a concern.     "

## 2022-12-21 ENCOUNTER — COMMITTEE REVIEW (OUTPATIENT)
Dept: TRANSPLANT | Facility: CLINIC | Age: 62
End: 2022-12-21

## 2022-12-21 NOTE — COMMITTEE REVIEW
"Abdominal Committee Review Note     Evaluation Date: 8/29/2019  Committee Review Date: 12/21/2022    Organ being evaluated for: Kidney    Transplant Phase: Waitlist  Transplant Status: Inactive    Transplant Coordinator: Florina Dominguez  Transplant Surgeon:       Referring Physician:     Primary Diagnosis: Diabetes Mellitus - Type II (Pancreas)  Secondary Diagnosis:     Committee Review Members:  Nephrology Rj Mcleod MD, Leonid Allen, APRN CNP, Clifford Mar MD   Pharmacist Macho Licona, [facility]    - Clinical Brandi Singh, [facility], Florina Thakkar, [facility]   Transplant Faina Bush, ALIA, Lokesh Arreola MD, Lanny Messer, RN, Florina Dominguez, RN, Humera Hutchison, RN, Maribell Maradiaga, ALIA, Yuli Orellana RN       Transplant Eligibility: Irreversible chronic kidney disease treated w/dialysis or expected need for dialysis    Committee Review Decision: Remain Inactive    Relative Contraindications:     Absolute Contraindications:     Committee Chair Lokesh Arreola MD verbally attested to the committee's decision.    Committee Discussion Details:     Reviewed the following:     -ESKD from DM2 on HD since 3/2021     -Pt has been inactive due to BMI since 2019. Recently discussed pt with his primary nephrologist. He isn't very compliant. Cuts runs short almost everytime, will call off and not reschedule once every 4-6 weeks. \"Does what he wants\" with his meds.     Committee determined pt will need to be on a 6 month compliance contract to include: fully completing dialysis runs, rescheduling dialysis if pt needs to cancel and taking medications as recommended by providers. Primary neph should sign off on compliance.       "
Yes

## 2022-12-22 ENCOUNTER — DOCUMENTATION ONLY (OUTPATIENT)
Dept: TRANSPLANT | Facility: CLINIC | Age: 62
End: 2022-12-22

## 2022-12-23 ENCOUNTER — TELEPHONE (OUTPATIENT)
Dept: TRANSPLANT | Facility: CLINIC | Age: 62
End: 2022-12-23

## 2022-12-23 NOTE — TELEPHONE ENCOUNTER
Called pt to update on committee decision for compliance contract. Unable to connect with pt via phone, message states call cannot be completed at this time. Will attempt to call pt on Monday.

## 2022-12-26 ENCOUNTER — TELEPHONE (OUTPATIENT)
Dept: TRANSPLANT | Facility: CLINIC | Age: 62
End: 2022-12-26

## 2022-12-26 NOTE — LETTER
Dioni Gray  [id number]  [place] [address]                December 26, 2022    Dear Mr. Gray,     The Transplant Multidisciplinary committee recently reviewed your case. Since there has been a pattern with cutting dialysis runs short, canceling treatments without rescheduling and not taking medications as prescribed, the Transplant Multidisciplinary committee recommends a 5 month compliance contract.     Please sign the contract sent to your email on 12/26/22 via [employer] if you are in agreement with this plan. Beginning December 26, 2022 you will need to:     1. Follow the recommendations of all health care providers including taking medications as prescribed and following a treatment plan. Need to communicate with the prescribing provider if a change is needed in your treatment plan.     2. Reschedule dialysis if a treatment needs to be missed.     3. Work with dialysis center if adjustments need to be made to be able to complete full run.     Please contact me or our center with any questions. Our clinic number is [phone].     Sincerely,     Florina Dominguez RN, BSN  Kidney Transplant Waitlist Coordinator   [phone]

## 2022-12-26 NOTE — Clinical Note
Hey there,   Can we please mail a copy of this letter to Que? Sorry I tried to route it to the pool but was unable to list it as a recipient.   Thank you,  Florina

## 2022-12-26 NOTE — TELEPHONE ENCOUNTER
"Called pts home phone, message states call cannot be completed at this time.     Called pt mobile number. Reviewed pt will need to be on a compliance contract for the next 6 months. Need to follow provider recommendations for medications, dialysis runs, and treatment plans. If a HD run needs to be cancelled pt will need to reschedule. Pt stated he cuts runs short due to blood pressure or cramping. Reviewed that pt needs to discuss with primary nephrologist if readjustments need to be made to runs. Pt said \"good luck with that\" and hung up on writer. Compliance contract sent to pts email via [employer] and letter sent to the patient.     Called HD center to discuss compliance contract with HD SW. Reviewed the above information. Erin is not in today. Email sent to SW with update on above information       "

## 2023-01-09 ENCOUNTER — TELEPHONE (OUTPATIENT)
Dept: TRANSPLANT | Facility: CLINIC | Age: 63
End: 2023-01-09

## 2023-01-09 NOTE — TELEPHONE ENCOUNTER
"Received email from JACEY MESSINA on 1/5/23:     \"I was able to speak to Que today and he is not interested to doing anything for transplant until he loses the weight he needs to lose. He said some time he will get on that and when he does lose the weight he will reach out. As for compliance, he does what he wants and most likely will not sign the contract.\"     Called JACEY Crowley, to discuss further. Reviewed BMI guidelines have recently changed and pt may be eligible to complete work up but we need to see better compliance from the pt before bringing him back for return visits. JACEY MESSINA is unsure if this will change his decision, requesting letter be sent for pt to read and SIDDHARTH will follow up with pt when she is in clinic next. Will work on sending letter.     "

## 2023-01-13 ENCOUNTER — DOCUMENTATION ONLY (OUTPATIENT)
Dept: TRANSPLANT | Facility: CLINIC | Age: 63
End: 2023-01-13

## 2023-01-29 ENCOUNTER — HEALTH MAINTENANCE LETTER (OUTPATIENT)
Age: 63
End: 2023-01-29

## 2023-04-18 ENCOUNTER — TELEPHONE (OUTPATIENT)
Dept: TRANSPLANT | Facility: CLINIC | Age: 63
End: 2023-04-18
Payer: MEDICARE

## 2023-04-18 NOTE — TELEPHONE ENCOUNTER
email sent to JACEY SW requesting update on compliance and weight loss.     Received email back from Carline, she is no longer the SW at [facility]. New SW is Pavan email is [email or web address]     email sent to pavan requesting update on pt.     4/19/23 3138 addendum: Received email back from Pavan. Pt missed 2 treatments in the last 90 days. Has had 5 treatments cut short. Dry weight is around 120 kgs, up from our last records. Will review at committee in June when compliance contract is up.

## 2023-05-07 ENCOUNTER — HEALTH MAINTENANCE LETTER (OUTPATIENT)
Age: 63
End: 2023-05-07

## 2023-06-28 ENCOUNTER — TELEPHONE (OUTPATIENT)
Dept: TRANSPLANT | Facility: CLINIC | Age: 63
End: 2023-06-28
Payer: MEDICARE

## 2023-06-28 NOTE — TELEPHONE ENCOUNTER
Email sent to HD SW to check in on weight and compliance.     HD SW sent dialysis attendence record. Pt weight currently 125kg. Pt has been better with dialysis compliance. Only missing 1 or 2 days and making it up now.

## 2023-07-12 ENCOUNTER — COMMITTEE REVIEW (OUTPATIENT)
Dept: TRANSPLANT | Facility: CLINIC | Age: 63
End: 2023-07-12
Payer: MEDICARE

## 2023-07-12 NOTE — COMMITTEE REVIEW
Abdominal Committee Review Note     Evaluation Date: 8/29/2019  Committee Review Date: 7/12/2023    Organ being evaluated for: Kidney    Transplant Phase: Waitlist  Transplant Status: Inactive    Transplant Coordinator: Florina Dominguez  Transplant Surgeon:       Referring Physician:     Primary Diagnosis: Diabetes Mellitus - Type II (Pancreas)  Secondary Diagnosis:     Committee Review Members:  Nephrology Rosie Alford PA-C, Clifford Mar MD   Nutrition Adina No, MARRY   Pharmacist Amy Johnson, [facility]    - Clinical Brandisangita Singh, [facility], Florina Thakkar, [facility]   Transplant KRISTAL LUNA, RN, Faina Bush, RN, Lanny Messer, RN, Florina Dominguez, RN, Esther Clark, RN, Humera Hutchison, ALIA, Elizabeth Arreaga, RN   Transplant Surgery Hector Kaye MD       Transplant Eligibility: Irreversible chronic kidney disease treated w/dialysis or expected need for dialysis    Committee Review Decision: Remain Inactive    Relative Contraindications:     Absolute Contraindications:     Committee Chair Hector Kaye MD verbally attested to the committee's decision.    Committee Discussion Details:     Reviewed the following:     ESKD from DM2 on HD since 3/2021     Reviewed pt 12/2022 due to compliance concerns. Appears he has improved with dialysis compliance and is only missing 1 or sessions. Will reschedule as well per HD SW. Has not lost any weight.     Committee determined if pts BMI is 38 or less should come in for RWL with surgeon for BMI check. If BMI is >38 should be referred to medical weight management.

## 2023-07-13 ENCOUNTER — TELEPHONE (OUTPATIENT)
Dept: TRANSPLANT | Facility: CLINIC | Age: 63
End: 2023-07-13
Payer: MEDICARE

## 2023-07-13 NOTE — TELEPHONE ENCOUNTER
Called HD center to touch base on compliance and recommendations for medical weight management. Spoke with ALIA Hou, she reports the patient does show up for his treatments but really has not improved with compliance. He will cut runs short and will only make extra runs if staff basically beg. He does not follow his fluid restriction and has needed extra runs to get fluid off. Signs out AMA frequently. He is aware he needs to lose weight for transplant and has not done so. She will send message to Dr. Rahman to give input on pts candidacy.

## 2023-08-31 NOTE — TELEPHONE ENCOUNTER
Called HD center to check in as writer has not heard back from 7/13/23 phone call. Reports Dr. Rahman is out on a family emergency, may be returning next week. Left message with direct line for return call when he returns.

## 2023-09-28 ENCOUNTER — TELEPHONE (OUTPATIENT)
Dept: TRANSPLANT | Facility: CLINIC | Age: 63
End: 2023-09-28
Payer: MEDICARE

## 2023-09-28 NOTE — TELEPHONE ENCOUNTER
Called HD center to touch base with Dr. Rahman regarding pts transplant candidacy. Left message with RN for call back.     9/28/23 addendum: Received call back from Dr. Rahman. At this time pt is not a transplant candidate due to non compliance. He is volume overloaded and K is high. Dr. Rahman has not seen the pt in clinic for the past few months, the pt will cut his runs short and leave when he knows the provider is coming. He is difficult and rude to dialysis staff. If he does not stick around for provider visit they will discuss having him go to a different center. Will review at our selection committee.

## 2023-10-04 ENCOUNTER — COMMITTEE REVIEW (OUTPATIENT)
Dept: TRANSPLANT | Facility: CLINIC | Age: 63
End: 2023-10-04
Payer: MEDICARE

## 2023-10-04 NOTE — COMMITTEE REVIEW
Abdominal Committee Review Note     Evaluation Date: 8/29/2019  Committee Review Date: 10/4/2023    Organ being evaluated for: Kidney    Transplant Phase: Waitlist  Transplant Status: Inactive    Transplant Coordinator: Florina Dominguez  Transplant Surgeon:       Referring Physician:     Primary Diagnosis: Diabetes Mellitus - Type II (Pancreas)  Secondary Diagnosis:     Committee Review Members:  Nephrology Rj Mcleod MD, Rosie Alford PA-C, Leonid Allen, APRN CNP, Clifford Mar MD   Nutrition Adina No, RD   Pharmacist Carie Hope, [facility]   Pharmacy Macho Licona, [facility], SACHIN STEEL RN    - Clinical Brandisangita Singh, [facility], Florina Thakkar, [facility]   Transplant KRISTAL LUNA, RN, Onelia Gottlieb, RN, Lanny Messer, RN, Florina Dominguez, RN, Esther Jj, NP, Esther Clark, RN, Humera Hutchison, RN, Elizabeth Arreaga, RN, Maribell Maradiaga, RN   Transplant Surgery Mireya Rucker MD, MD       Transplant Eligibility: Irreversible chronic kidney disease treated w/dialysis or expected need for dialysis    Committee Review Decision: Remove    Relative Contraindications:     Absolute Contraindications:     Committee Chair Mireya Rucker MD verbally attested to the committee's decision.    Committee Discussion Details:     ESKD from DM2 on HD since 3/2021        Reviewed pt at committee 7/2023, Information from HD SW was pt was improving with compliance. Spoke with Nursing staff and that was not the case. Pts compliance has not improved with compliance contract. Skips runs and will cut runs short frequently. Pt is not engaged with dialysis staff. Has needed to lose weight for transplant since 2019 and has not made any progress. Spoke with primary neph Dr. Rahman 9/28/23, doesn't think he is a transplant candidate at this time due to non compliance. Almost always volume up and k is high. Hasn't seen the pt in clinic for a few months as pt will cut runs short when he  knows MD is coming. They will be discussing with him that if he does not stick around for MD assessment he will need to find a new HD center.     Committee determined that due to non compliance pt is no longer a transplant candidate through our center and should be removed from our waitlist.

## 2023-10-05 ENCOUNTER — DOCUMENTATION ONLY (OUTPATIENT)
Dept: TRANSPLANT | Facility: CLINIC | Age: 63
End: 2023-10-05

## 2023-10-05 ENCOUNTER — TELEPHONE (OUTPATIENT)
Dept: TRANSPLANT | Facility: CLINIC | Age: 63
End: 2023-10-05
Payer: MEDICARE

## 2023-10-05 NOTE — TELEPHONE ENCOUNTER
Attempted to call pt to update committee decision to delist due to non compliance. Home phone number with message that call cannot be completed at this time. Called mobile number. Left  with direct line for return call.     Called HD center to update pt is no longer a candidate and he is being removed from our list. Spoke with clinic manager, Shiv. Reviewed pt is no longer a candidate and is being removed from the waitlist. She will attempt to discuss with pt as well.

## 2023-10-08 ENCOUNTER — HEALTH MAINTENANCE LETTER (OUTPATIENT)
Age: 63
End: 2023-10-08

## 2024-02-25 ENCOUNTER — HEALTH MAINTENANCE LETTER (OUTPATIENT)
Age: 64
End: 2024-02-25

## (undated) RX ORDER — ALBUTEROL SULFATE 0.83 MG/ML
SOLUTION RESPIRATORY (INHALATION)
Status: DISPENSED
Start: 2019-08-29